# Patient Record
Sex: FEMALE | Race: WHITE
[De-identification: names, ages, dates, MRNs, and addresses within clinical notes are randomized per-mention and may not be internally consistent; named-entity substitution may affect disease eponyms.]

---

## 2018-10-02 ENCOUNTER — HOSPITAL ENCOUNTER (OUTPATIENT)
Dept: HOSPITAL 75 - PREOP | Age: 28
Discharge: HOME | End: 2018-10-02
Attending: OBSTETRICS & GYNECOLOGY
Payer: COMMERCIAL

## 2018-10-02 VITALS — WEIGHT: 199 LBS | HEIGHT: 67 IN | BODY MASS INDEX: 31.23 KG/M2

## 2018-10-02 DIAGNOSIS — Z01.818: Primary | ICD-10-CM

## 2018-10-05 ENCOUNTER — HOSPITAL ENCOUNTER (OUTPATIENT)
Dept: HOSPITAL 75 - SDC | Age: 28
Discharge: HOME | End: 2018-10-05
Attending: OBSTETRICS & GYNECOLOGY
Payer: COMMERCIAL

## 2018-10-05 VITALS — BODY MASS INDEX: 31.23 KG/M2 | HEIGHT: 67 IN | WEIGHT: 199 LBS

## 2018-10-05 VITALS — SYSTOLIC BLOOD PRESSURE: 115 MMHG | DIASTOLIC BLOOD PRESSURE: 70 MMHG

## 2018-10-05 VITALS — DIASTOLIC BLOOD PRESSURE: 70 MMHG | SYSTOLIC BLOOD PRESSURE: 115 MMHG

## 2018-10-05 VITALS — SYSTOLIC BLOOD PRESSURE: 109 MMHG | DIASTOLIC BLOOD PRESSURE: 74 MMHG

## 2018-10-05 VITALS — DIASTOLIC BLOOD PRESSURE: 71 MMHG | SYSTOLIC BLOOD PRESSURE: 123 MMHG

## 2018-10-05 VITALS — DIASTOLIC BLOOD PRESSURE: 81 MMHG | SYSTOLIC BLOOD PRESSURE: 124 MMHG

## 2018-10-05 DIAGNOSIS — N85.9: ICD-10-CM

## 2018-10-05 DIAGNOSIS — E03.9: ICD-10-CM

## 2018-10-05 DIAGNOSIS — Z79.899: ICD-10-CM

## 2018-10-05 DIAGNOSIS — N93.8: Primary | ICD-10-CM

## 2018-10-05 LAB
BASOPHILS # BLD AUTO: 0 10^3/UL (ref 0–0.1)
BASOPHILS NFR BLD AUTO: 0 % (ref 0–10)
EOSINOPHIL # BLD AUTO: 0.2 10^3/UL (ref 0–0.3)
EOSINOPHIL NFR BLD AUTO: 2 % (ref 0–10)
ERYTHROCYTE [DISTWIDTH] IN BLOOD BY AUTOMATED COUNT: 13.2 % (ref 10–14.5)
HCT VFR BLD CALC: 41 % (ref 35–52)
HGB BLD-MCNC: 13.6 G/DL (ref 11.5–16)
LYMPHOCYTES # BLD AUTO: 2.9 X 10^3 (ref 1–4)
LYMPHOCYTES NFR BLD AUTO: 32 % (ref 12–44)
MANUAL DIFFERENTIAL PERFORMED BLD QL: NO
MCH RBC QN AUTO: 29 PG (ref 25–34)
MCHC RBC AUTO-ENTMCNC: 33 G/DL (ref 32–36)
MCV RBC AUTO: 87 FL (ref 80–99)
MONOCYTES # BLD AUTO: 0.5 X 10^3 (ref 0–1)
MONOCYTES NFR BLD AUTO: 6 % (ref 0–12)
NEUTROPHILS # BLD AUTO: 5.6 X 10^3 (ref 1.8–7.8)
NEUTROPHILS NFR BLD AUTO: 60 % (ref 42–75)
PLATELET # BLD: 249 10^3/UL (ref 130–400)
PMV BLD AUTO: 12.4 FL (ref 7.4–10.4)
RBC # BLD AUTO: 4.76 10^6/UL (ref 4.35–5.85)
WBC # BLD AUTO: 9.2 10^3/UL (ref 4.3–11)

## 2018-10-05 PROCEDURE — 85025 COMPLETE CBC W/AUTO DIFF WBC: CPT

## 2018-10-05 PROCEDURE — 88305 TISSUE EXAM BY PATHOLOGIST: CPT

## 2018-10-05 PROCEDURE — 36415 COLL VENOUS BLD VENIPUNCTURE: CPT

## 2018-10-05 PROCEDURE — 84703 CHORIONIC GONADOTROPIN ASSAY: CPT

## 2018-10-05 PROCEDURE — 87081 CULTURE SCREEN ONLY: CPT

## 2018-10-05 RX ADMIN — SODIUM CHLORIDE, SODIUM LACTATE, POTASSIUM CHLORIDE, AND CALCIUM CHLORIDE PRN MLS/HR: 600; 310; 30; 20 INJECTION, SOLUTION INTRAVENOUS at 14:56

## 2018-10-05 RX ADMIN — SODIUM CHLORIDE, SODIUM LACTATE, POTASSIUM CHLORIDE, AND CALCIUM CHLORIDE PRN MLS/HR: 600; 310; 30; 20 INJECTION, SOLUTION INTRAVENOUS at 13:39

## 2018-10-05 NOTE — OPERATIVE REPORT
DATE OF SERVICE:  10/05/2018



PREOPERATIVE DIAGNOSES:

Dysfunctional uterine bleeding and intrauterine mass.



POSTOPERATIVE DIAGNOSES:

Dysfunctional uterine bleeding and intrauterine mass with likely endometrial

polyps.



OPERATIVE PROCEDURE:

Hysteroscopy with directed biopsy and D and C.



OPERATIVE DESCRIPTION:

With the patient in supine position under satisfactory general anesthesia, she

was prepped and draped in the usual fashion for vaginal surgery after being

repositioned in the dorsal lithotomy position in the candy cane stirrups.



The urinary bladder was drained with a straight catheter.  Weighted speculum

placed in the posterior fornix of vagina, cervix exposed and grasped anteriorly

with single tooth tenaculum.  Uterus sounded to 9 cm with uterine sound.  Cervix

was then serially dilated with Adarsh dilators to accommodate a hysteroscope. 

Using LR as a distending medium, the hysteroscope was used to examine the

endometrial cavity.  There were polypoid masses emanating from the lower

anterior uterine wall.  Representative biopsies were taken from the right and

from the left and then the endometrial cavity was sharply curettaged in all 4

quadrants with removal of an additional aliquot of tissue.  All the tissue was

sent to pathology labeled appropriately.  The endometrial cavity was reexamined

now with hysteroscope, finding no bleeding and no remaining abnormal pathology,

the procedure was terminated.



The hysteroscope was removed as was the tenaculum.  There was no bleeding from

the puncture sites from the tenaculum.  There was no bleeding from the cervical

os.



Sponge and needle counts were correct.  Estimated blood loss was minimal.  A

total of 500 mL of LR was used and almost that same amount was recovered.  The

patient tolerated the procedure well and was uneventfully awakened from her

general anesthesia and transferred to recovery in stable condition with plans

for discharge home PAR.





Job ID: [id number]

DocumentID: [id number]

Dictated Date:  10/05/2018 14:48:12

Transcription Date: 10/05/2018 16:03:57

Dictated By: DORI SOUSA MD

## 2018-10-05 NOTE — XMS REPORT
Continuity of Care Document

 Created on: 10/05/2018



MELINDA GORMAN

External Reference #: [id number]

: 1990

Sex: Female



Demographics







 Address  [address] [place]  [address]

 

 Home Phone  [phone] x

 

 Preferred Language  Unknown

 

 Marital Status  Unknown

 

 Mandaen Affiliation  Unknown

 

 Race  Unknown

 

 Ethnic Group  Unknown





Author







 Author  Counts include 234 beds at the [facility]

 

 Address  Unknown

 

 Phone  Unavailable



              



Allergies

      





 Active            Description            Code            Type            
Severity            Reaction            Onset            Reported/Identified   
         Relationship to Patient            Clinical Status        

 

 Yes            No Known Drug Allergies            [id number]            Drug 
Allergy            Unknown            N/A                         2011   
                               



                  



Medications

      



There is no data.                  



Problems

      





 Date Dx Coded            Attending            Type            Code            
Diagnosis            Diagnosed By        

 

 2013                                      V72.41            PREGNANCY 
TEST NEGATIVE RESULT                     

 

 2013            SHIRA COREAS DO                         V72.41         
   PREGNANCY TEST NEGATIVE RESULT                     

 

 2015            SHIRA COREAS DO                         V05.3          
  HEP B (ADULT) DX                     

 

 2015            DORI SOUSA MD            Ot            N93.8
                                  

 

 2015            DORI SOUSA MD            Ot            
Z01.818                                  

 

 2015            DORI SOUSA MD            Ot            N85.8
            OTHER SPECIFIED NONINFLAMMATORY DISORDER                     

 

 2015            DORI SOUSA MD, Ot            N93.8
            OTHER SPECIFIED ABNORMAL UTERINE AND VAG                     



                              



Procedures

      





 Code            Description            Performed By            Performed On   
     

 

             56311                                  URINE PREGNANCY TEST (IN-
HOUSE)                                   2013        



                  



Results

      



There is no data.              



Encounters

      





 ACCT No.            Visit Date/Time            Discharge            Status    
        Pt. Type            Provider            Facility            Loc./Unit  
          Complaint        

 

 [id number]            2015 16:21:00            2015 23:59:59          
  [facility]            Outpatient            SHIRA COREAS DO                        
                       

 

 [id number]            2013 16:09:00            2013 23:59:59          
  [facility]            Outpatient                                                    
        

 

 [id number]            2015 11:04:00            2015 15:45:00    
        DIS            Outpatient            DORI SOUSA MD         
   [facility]                     

 

 [id number]            2015 05:47:00            2015 23:59:59    
        CLS            Outpatient            DORI SOUSA MD         
   [facility]            PREOP                     

 

 [id number]            2014 10:55:00            2014 23:59:59    
        [facility]            Outpatient                                              
              

 

 [id number]            2013 10:07:00            2013 23:59:59    
        [facility]            Outpatient                                              
              

 

 2018 06:04:40            2018 23:59:59         
   CLS            Outpatient            Ama Blair

## 2018-10-05 NOTE — DISCHARGE INSTRUCTIONS
Discharge Instructions


Discharge Medications


New, Converted or Re-Newed RX:  RX on Chart





Patient Instructions


Patient Instructions:  


As directed


Return to The Hospital For:  


as directed





Activity & Diet


Discharge Diet:  No Restrictions


Activity as Tolerated:  No





Orders-Post D/C & Referrals





Follow Up Appt:


Call to make follow up appt. for patient in 2 weeks.





Activity: 


Rest for 24 hours, than as tolerated. 





Diet: 


As tolerated-Clear Liquids only if nauseated.





May shower or tub bathe as desired.





No driving for 24 hours, no alcoholic beverages for 24 hours, and nothing per 

vagina (no tampons, douching, or intercourse) for 2 weeks.





Patient to return to the clinic as soon as possible for: Temperature greater 

than 101F, Severe Pain, Foul discharge from incision or vagina, Excessive 

Bleeding (more than a period).











DORI SOUSA MD Oct 5, 2018 1:56 pm

## 2018-10-05 NOTE — PROGRESS NOTE-PRE OPERATIVE
Pre-Operative Progress Note


H&P Reviewed


The H&P was reviewed, patient examined and no changes noted.


Date Seen by Provider:  Oct 5, 2018


Time Seen by Provider:  13:49


Date H&P Reviewed:  Oct 5, 2018


Time H&P Reviewed:  13:49


Pre-Operative Diagnosis:  dub/iNTRAUTERINE MASS











DORI SOUSA MD Oct 5, 2018 1:50 pm

## 2018-10-05 NOTE — PROGRESS NOTE-POST OPERATIVE
Post-Operative Progess Note


Surgeon (s)/Assistant (s)


Surgeon


DORI SOUSA MD


Assistant:  NONE





Pre-Operative Diagnosis


dub/iNTRAUTERINE MASS





Post-Operative Diagnosis





Same with pathology pending





Procedure & Operative Findings


Date of Procedure


10/5/18


Procedure Performed/Findings


Hysteroscopy with directed biopsy and D&C


Anesthesia Type


GETA





Estimated Blood Loss


Estimated blood loss (mL):  MIN





Specimens/Packing


Specimens Removed


Directed biopsy and endometrial curettings


Packing:  


None required











DORI SOUSA MD Oct 5, 2018 13:50

## 2019-09-04 ENCOUNTER — HOSPITAL ENCOUNTER (INPATIENT)
Dept: HOSPITAL 75 - LDRP | Age: 29
LOS: 2 days | Discharge: HOME | End: 2019-09-06
Attending: OBSTETRICS & GYNECOLOGY | Admitting: OBSTETRICS & GYNECOLOGY
Payer: COMMERCIAL

## 2019-09-04 VITALS — DIASTOLIC BLOOD PRESSURE: 73 MMHG | SYSTOLIC BLOOD PRESSURE: 130 MMHG

## 2019-09-04 VITALS — DIASTOLIC BLOOD PRESSURE: 74 MMHG | SYSTOLIC BLOOD PRESSURE: 123 MMHG

## 2019-09-04 VITALS — DIASTOLIC BLOOD PRESSURE: 76 MMHG | SYSTOLIC BLOOD PRESSURE: 123 MMHG

## 2019-09-04 VITALS — SYSTOLIC BLOOD PRESSURE: 116 MMHG | DIASTOLIC BLOOD PRESSURE: 70 MMHG

## 2019-09-04 VITALS — DIASTOLIC BLOOD PRESSURE: 46 MMHG | SYSTOLIC BLOOD PRESSURE: 81 MMHG

## 2019-09-04 VITALS — DIASTOLIC BLOOD PRESSURE: 64 MMHG | SYSTOLIC BLOOD PRESSURE: 111 MMHG

## 2019-09-04 VITALS — HEIGHT: 66 IN | WEIGHT: 213.01 LBS | BODY MASS INDEX: 34.23 KG/M2

## 2019-09-04 VITALS — SYSTOLIC BLOOD PRESSURE: 104 MMHG | DIASTOLIC BLOOD PRESSURE: 58 MMHG

## 2019-09-04 VITALS — SYSTOLIC BLOOD PRESSURE: 117 MMHG | DIASTOLIC BLOOD PRESSURE: 64 MMHG

## 2019-09-04 VITALS — DIASTOLIC BLOOD PRESSURE: 62 MMHG | SYSTOLIC BLOOD PRESSURE: 135 MMHG

## 2019-09-04 VITALS — SYSTOLIC BLOOD PRESSURE: 110 MMHG | DIASTOLIC BLOOD PRESSURE: 60 MMHG

## 2019-09-04 VITALS — SYSTOLIC BLOOD PRESSURE: 125 MMHG | DIASTOLIC BLOOD PRESSURE: 69 MMHG

## 2019-09-04 VITALS — SYSTOLIC BLOOD PRESSURE: 124 MMHG | DIASTOLIC BLOOD PRESSURE: 72 MMHG

## 2019-09-04 VITALS — SYSTOLIC BLOOD PRESSURE: 123 MMHG | DIASTOLIC BLOOD PRESSURE: 72 MMHG

## 2019-09-04 VITALS — SYSTOLIC BLOOD PRESSURE: 124 MMHG | DIASTOLIC BLOOD PRESSURE: 70 MMHG

## 2019-09-04 VITALS — DIASTOLIC BLOOD PRESSURE: 63 MMHG | SYSTOLIC BLOOD PRESSURE: 111 MMHG

## 2019-09-04 VITALS — SYSTOLIC BLOOD PRESSURE: 115 MMHG | DIASTOLIC BLOOD PRESSURE: 68 MMHG

## 2019-09-04 VITALS — DIASTOLIC BLOOD PRESSURE: 55 MMHG | SYSTOLIC BLOOD PRESSURE: 114 MMHG

## 2019-09-04 VITALS — DIASTOLIC BLOOD PRESSURE: 74 MMHG | SYSTOLIC BLOOD PRESSURE: 128 MMHG

## 2019-09-04 VITALS — DIASTOLIC BLOOD PRESSURE: 66 MMHG | SYSTOLIC BLOOD PRESSURE: 121 MMHG

## 2019-09-04 VITALS — DIASTOLIC BLOOD PRESSURE: 62 MMHG | SYSTOLIC BLOOD PRESSURE: 121 MMHG

## 2019-09-04 VITALS — SYSTOLIC BLOOD PRESSURE: 127 MMHG | DIASTOLIC BLOOD PRESSURE: 71 MMHG

## 2019-09-04 VITALS — DIASTOLIC BLOOD PRESSURE: 74 MMHG | SYSTOLIC BLOOD PRESSURE: 116 MMHG

## 2019-09-04 VITALS — DIASTOLIC BLOOD PRESSURE: 73 MMHG | SYSTOLIC BLOOD PRESSURE: 127 MMHG

## 2019-09-04 VITALS — DIASTOLIC BLOOD PRESSURE: 76 MMHG | SYSTOLIC BLOOD PRESSURE: 120 MMHG

## 2019-09-04 VITALS — DIASTOLIC BLOOD PRESSURE: 59 MMHG | SYSTOLIC BLOOD PRESSURE: 113 MMHG

## 2019-09-04 VITALS — SYSTOLIC BLOOD PRESSURE: 126 MMHG | DIASTOLIC BLOOD PRESSURE: 58 MMHG

## 2019-09-04 VITALS — SYSTOLIC BLOOD PRESSURE: 130 MMHG | DIASTOLIC BLOOD PRESSURE: 72 MMHG

## 2019-09-04 VITALS — DIASTOLIC BLOOD PRESSURE: 62 MMHG | SYSTOLIC BLOOD PRESSURE: 130 MMHG

## 2019-09-04 VITALS — DIASTOLIC BLOOD PRESSURE: 55 MMHG | SYSTOLIC BLOOD PRESSURE: 98 MMHG

## 2019-09-04 VITALS — DIASTOLIC BLOOD PRESSURE: 55 MMHG | SYSTOLIC BLOOD PRESSURE: 105 MMHG

## 2019-09-04 VITALS — SYSTOLIC BLOOD PRESSURE: 111 MMHG | DIASTOLIC BLOOD PRESSURE: 54 MMHG

## 2019-09-04 VITALS — SYSTOLIC BLOOD PRESSURE: 104 MMHG | DIASTOLIC BLOOD PRESSURE: 59 MMHG

## 2019-09-04 VITALS — SYSTOLIC BLOOD PRESSURE: 121 MMHG | DIASTOLIC BLOOD PRESSURE: 67 MMHG

## 2019-09-04 VITALS — SYSTOLIC BLOOD PRESSURE: 108 MMHG | DIASTOLIC BLOOD PRESSURE: 71 MMHG

## 2019-09-04 VITALS — DIASTOLIC BLOOD PRESSURE: 66 MMHG | SYSTOLIC BLOOD PRESSURE: 112 MMHG

## 2019-09-04 VITALS — SYSTOLIC BLOOD PRESSURE: 113 MMHG | DIASTOLIC BLOOD PRESSURE: 67 MMHG

## 2019-09-04 VITALS — DIASTOLIC BLOOD PRESSURE: 71 MMHG | SYSTOLIC BLOOD PRESSURE: 109 MMHG

## 2019-09-04 VITALS — SYSTOLIC BLOOD PRESSURE: 123 MMHG | DIASTOLIC BLOOD PRESSURE: 60 MMHG

## 2019-09-04 VITALS — DIASTOLIC BLOOD PRESSURE: 58 MMHG | SYSTOLIC BLOOD PRESSURE: 106 MMHG

## 2019-09-04 VITALS — SYSTOLIC BLOOD PRESSURE: 115 MMHG | DIASTOLIC BLOOD PRESSURE: 77 MMHG

## 2019-09-04 VITALS — SYSTOLIC BLOOD PRESSURE: 115 MMHG | DIASTOLIC BLOOD PRESSURE: 63 MMHG

## 2019-09-04 VITALS — DIASTOLIC BLOOD PRESSURE: 75 MMHG | SYSTOLIC BLOOD PRESSURE: 125 MMHG

## 2019-09-04 VITALS — DIASTOLIC BLOOD PRESSURE: 81 MMHG | SYSTOLIC BLOOD PRESSURE: 117 MMHG

## 2019-09-04 VITALS — SYSTOLIC BLOOD PRESSURE: 112 MMHG | DIASTOLIC BLOOD PRESSURE: 67 MMHG

## 2019-09-04 VITALS — SYSTOLIC BLOOD PRESSURE: 108 MMHG | DIASTOLIC BLOOD PRESSURE: 65 MMHG

## 2019-09-04 VITALS — SYSTOLIC BLOOD PRESSURE: 132 MMHG | DIASTOLIC BLOOD PRESSURE: 60 MMHG

## 2019-09-04 VITALS — DIASTOLIC BLOOD PRESSURE: 60 MMHG | SYSTOLIC BLOOD PRESSURE: 119 MMHG

## 2019-09-04 VITALS — DIASTOLIC BLOOD PRESSURE: 72 MMHG | SYSTOLIC BLOOD PRESSURE: 123 MMHG

## 2019-09-04 VITALS — SYSTOLIC BLOOD PRESSURE: 121 MMHG | DIASTOLIC BLOOD PRESSURE: 74 MMHG

## 2019-09-04 VITALS — SYSTOLIC BLOOD PRESSURE: 128 MMHG | DIASTOLIC BLOOD PRESSURE: 58 MMHG

## 2019-09-04 VITALS — SYSTOLIC BLOOD PRESSURE: 108 MMHG | DIASTOLIC BLOOD PRESSURE: 62 MMHG

## 2019-09-04 VITALS — SYSTOLIC BLOOD PRESSURE: 127 MMHG | DIASTOLIC BLOOD PRESSURE: 73 MMHG

## 2019-09-04 VITALS — DIASTOLIC BLOOD PRESSURE: 65 MMHG | SYSTOLIC BLOOD PRESSURE: 120 MMHG

## 2019-09-04 VITALS — DIASTOLIC BLOOD PRESSURE: 59 MMHG | SYSTOLIC BLOOD PRESSURE: 101 MMHG

## 2019-09-04 VITALS — DIASTOLIC BLOOD PRESSURE: 79 MMHG | SYSTOLIC BLOOD PRESSURE: 135 MMHG

## 2019-09-04 VITALS — SYSTOLIC BLOOD PRESSURE: 136 MMHG | DIASTOLIC BLOOD PRESSURE: 72 MMHG

## 2019-09-04 VITALS — SYSTOLIC BLOOD PRESSURE: 105 MMHG | DIASTOLIC BLOOD PRESSURE: 66 MMHG

## 2019-09-04 VITALS — DIASTOLIC BLOOD PRESSURE: 62 MMHG | SYSTOLIC BLOOD PRESSURE: 118 MMHG

## 2019-09-04 VITALS — DIASTOLIC BLOOD PRESSURE: 65 MMHG | SYSTOLIC BLOOD PRESSURE: 111 MMHG

## 2019-09-04 VITALS — SYSTOLIC BLOOD PRESSURE: 168 MMHG | DIASTOLIC BLOOD PRESSURE: 72 MMHG

## 2019-09-04 VITALS — SYSTOLIC BLOOD PRESSURE: 107 MMHG | DIASTOLIC BLOOD PRESSURE: 58 MMHG

## 2019-09-04 VITALS — DIASTOLIC BLOOD PRESSURE: 67 MMHG | SYSTOLIC BLOOD PRESSURE: 109 MMHG

## 2019-09-04 VITALS — SYSTOLIC BLOOD PRESSURE: 109 MMHG | DIASTOLIC BLOOD PRESSURE: 55 MMHG

## 2019-09-04 VITALS — SYSTOLIC BLOOD PRESSURE: 115 MMHG | DIASTOLIC BLOOD PRESSURE: 66 MMHG

## 2019-09-04 VITALS — SYSTOLIC BLOOD PRESSURE: 115 MMHG | DIASTOLIC BLOOD PRESSURE: 64 MMHG

## 2019-09-04 VITALS — DIASTOLIC BLOOD PRESSURE: 59 MMHG | SYSTOLIC BLOOD PRESSURE: 102 MMHG

## 2019-09-04 VITALS — DIASTOLIC BLOOD PRESSURE: 57 MMHG | SYSTOLIC BLOOD PRESSURE: 112 MMHG

## 2019-09-04 VITALS — DIASTOLIC BLOOD PRESSURE: 76 MMHG | SYSTOLIC BLOOD PRESSURE: 122 MMHG

## 2019-09-04 VITALS — SYSTOLIC BLOOD PRESSURE: 113 MMHG | DIASTOLIC BLOOD PRESSURE: 59 MMHG

## 2019-09-04 VITALS — SYSTOLIC BLOOD PRESSURE: 120 MMHG | DIASTOLIC BLOOD PRESSURE: 63 MMHG

## 2019-09-04 VITALS — SYSTOLIC BLOOD PRESSURE: 116 MMHG | DIASTOLIC BLOOD PRESSURE: 59 MMHG

## 2019-09-04 VITALS — SYSTOLIC BLOOD PRESSURE: 122 MMHG | DIASTOLIC BLOOD PRESSURE: 64 MMHG

## 2019-09-04 VITALS — DIASTOLIC BLOOD PRESSURE: 71 MMHG | SYSTOLIC BLOOD PRESSURE: 112 MMHG

## 2019-09-04 VITALS — SYSTOLIC BLOOD PRESSURE: 124 MMHG | DIASTOLIC BLOOD PRESSURE: 71 MMHG

## 2019-09-04 VITALS — DIASTOLIC BLOOD PRESSURE: 62 MMHG | SYSTOLIC BLOOD PRESSURE: 124 MMHG

## 2019-09-04 DIAGNOSIS — Z3A.40: ICD-10-CM

## 2019-09-04 DIAGNOSIS — E03.9: ICD-10-CM

## 2019-09-04 DIAGNOSIS — Z23: ICD-10-CM

## 2019-09-04 LAB
BASOPHILS # BLD AUTO: 0 10^3/UL (ref 0–0.1)
BASOPHILS NFR BLD AUTO: 0 % (ref 0–10)
EOSINOPHIL # BLD AUTO: 0.2 10^3/UL (ref 0–0.3)
EOSINOPHIL NFR BLD AUTO: 1 % (ref 0–10)
ERYTHROCYTE [DISTWIDTH] IN BLOOD BY AUTOMATED COUNT: 15.1 % (ref 10–14.5)
HCT VFR BLD CALC: 34 % (ref 35–52)
HGB BLD-MCNC: 11.1 G/DL (ref 11.5–16)
LYMPHOCYTES # BLD AUTO: 2 X 10^3 (ref 1–4)
LYMPHOCYTES NFR BLD AUTO: 17 % (ref 12–44)
MANUAL DIFFERENTIAL PERFORMED BLD QL: NO
MCH RBC QN AUTO: 27 PG (ref 25–34)
MCHC RBC AUTO-ENTMCNC: 33 G/DL (ref 32–36)
MCV RBC AUTO: 83 FL (ref 80–99)
MONOCYTES # BLD AUTO: 0.6 X 10^3 (ref 0–1)
MONOCYTES NFR BLD AUTO: 5 % (ref 0–12)
NEUTROPHILS # BLD AUTO: 8.9 X 10^3 (ref 1.8–7.8)
NEUTROPHILS NFR BLD AUTO: 76 % (ref 42–75)
PLATELET # BLD: 218 10^3/UL (ref 130–400)
PMV BLD AUTO: 12 FL (ref 7.4–10.4)
WBC # BLD AUTO: 11.7 10^3/UL (ref 4.3–11)

## 2019-09-04 PROCEDURE — 86901 BLOOD TYPING SEROLOGIC RH(D): CPT

## 2019-09-04 PROCEDURE — 86850 RBC ANTIBODY SCREEN: CPT

## 2019-09-04 PROCEDURE — 3E033VJ INTRODUCTION OF OTHER HORMONE INTO PERIPHERAL VEIN, PERCUTANEOUS APPROACH: ICD-10-PCS | Performed by: OBSTETRICS & GYNECOLOGY

## 2019-09-04 PROCEDURE — 36415 COLL VENOUS BLD VENIPUNCTURE: CPT

## 2019-09-04 PROCEDURE — 88307 TISSUE EXAM BY PATHOLOGIST: CPT

## 2019-09-04 PROCEDURE — 90715 TDAP VACCINE 7 YRS/> IM: CPT

## 2019-09-04 PROCEDURE — 0KQM0ZZ REPAIR PERINEUM MUSCLE, OPEN APPROACH: ICD-10-PCS | Performed by: OBSTETRICS & GYNECOLOGY

## 2019-09-04 PROCEDURE — 85025 COMPLETE CBC W/AUTO DIFF WBC: CPT

## 2019-09-04 PROCEDURE — 86900 BLOOD TYPING SEROLOGIC ABO: CPT

## 2019-09-04 RX ADMIN — SODIUM CHLORIDE, SODIUM LACTATE, POTASSIUM CHLORIDE, CALCIUM CHLORIDE, AND DEXTROSE MONOHYDRATE SCH MLS/HR: 600; 310; 30; 20; 5 INJECTION, SOLUTION INTRAVENOUS at 07:40

## 2019-09-04 RX ADMIN — SODIUM CHLORIDE, SODIUM LACTATE, POTASSIUM CHLORIDE, CALCIUM CHLORIDE, AND DEXTROSE MONOHYDRATE SCH MLS/HR: 600; 310; 30; 20; 5 INJECTION, SOLUTION INTRAVENOUS at 19:52

## 2019-09-04 RX ADMIN — SODIUM CHLORIDE, SODIUM LACTATE, POTASSIUM CHLORIDE, CALCIUM CHLORIDE, AND DEXTROSE MONOHYDRATE SCH MLS/HR: 600; 310; 30; 20; 5 INJECTION, SOLUTION INTRAVENOUS at 13:42

## 2019-09-04 NOTE — NUR
MELINDA GONZALEZ presented to unit via AMB from HOME, accompanied by SPOUSE, with c/o 
INDUCTION. MELINDA GONZALEZ weighed, gowned,and to bed. 0714 EF and TOCO applied, VS 
taken.  MELINDA GONZALEZ oriented to bed controls, call light, TV, heat, and A/C controls.

## 2019-09-04 NOTE — NUR
1150  REINALDO ZURITA AND MAXIMILIANO ROSEN CRNA here for epidural placement. Procedure 
explained, consent reviewed and signed by anesthesia.  Questions answered to patient's 
satisfaction. Time out taken to verify correct patient/procedure. 1152 Patient up to side of 
bed, assisted into sitting position. 1156  Betadine prep done x3 and sterile drape applied.  
1158 Local done, see anesthesia record.  1206 Test dose given, see anesthesia record for 
drug and dosage.  Epidural catheter secured in place.  Epidural placement complete.  1213 
Assisted back into bed, monitors adjusted.  1207 Epidural dosed, see anesthesia record. 1212 
Epidural of Sufenta/Bupvicaine @12cc/hr stated per pump.  Patient tolerated procedure well.

## 2019-09-04 NOTE — HISTORY & PHYSICAL
History and Physical


Date Seen by Provider:  Sep 4, 2019


Time Seen by Provider:  08:11


This patient is a 28-year-old  1 white female.  EDC is 2019 

patient her 40 weeks admission.  She is admitted now for elective induction of 

labor or pregnancy has been uncomplicated and her GBS culture done after 35 

weeks gestation was negative.  Patient denies rupture membranes denies bleeding 

denies contraction denies nausea vomiting.





Allergies are none





Medications are levothyroxine 200 g daily and prenatal vitamins





Medical social and surgical histories are per the antepartum record





HEENT exam is normal


Neck supple with no lymphadenopathy no thyromegaly


Abdomen is gravid soft nontender nondistended


Extremities show no clubbing or cyanosis.  There is no Homans sign.





Pelvic exam is pending.  Last pelvic exam performed in clinic yesterday shows 

cervix once immune dilated 80 percent effaced -1 to -2 station vertex 

presentation soft and somewhat posterior.





Assessment and plan   term pregnancy now at 40 weeks gestation admitted for 

induction of labor anticipation is for vaginal delivery.  Patient does 

understand there is some possibility that she could require  delivery 

she accepts the induction risks as well as the delivery risks.  She is ready to 

proceed


40 week gestation admitted for elective induction of labor





Allergies and Home Medications


Allergies


Coded Allergies:  


     No Known Drug Allergies (Unverified , 11)





Home Medications


Levothyroxine Sodium 200 Mcg Tablet, 200 MCG PO DAILY, (Reported)


Oxycodone HCl/Acetaminophen 1 Each Tablet, 1 EACH PO Q4H PRN for PAIN-MODERATE


   Prescribed by: DORI SUAREZ on 10/5/18 1354


Prenatal Vit/Iron Fumarate/FA 1 Each Tablet, 1 EACH PO DAILY, (Reported)





Patient Home Medication List


Home Medication List Reviewed:  Yes











DORI SOUSA MD        Sep 4, 2019 08:14

## 2019-09-05 VITALS — DIASTOLIC BLOOD PRESSURE: 69 MMHG | SYSTOLIC BLOOD PRESSURE: 125 MMHG

## 2019-09-05 VITALS — DIASTOLIC BLOOD PRESSURE: 68 MMHG | SYSTOLIC BLOOD PRESSURE: 118 MMHG

## 2019-09-05 VITALS — DIASTOLIC BLOOD PRESSURE: 68 MMHG | SYSTOLIC BLOOD PRESSURE: 123 MMHG

## 2019-09-05 VITALS — DIASTOLIC BLOOD PRESSURE: 66 MMHG | SYSTOLIC BLOOD PRESSURE: 117 MMHG

## 2019-09-05 VITALS — DIASTOLIC BLOOD PRESSURE: 57 MMHG | SYSTOLIC BLOOD PRESSURE: 119 MMHG

## 2019-09-05 VITALS — SYSTOLIC BLOOD PRESSURE: 119 MMHG | DIASTOLIC BLOOD PRESSURE: 85 MMHG

## 2019-09-05 VITALS — DIASTOLIC BLOOD PRESSURE: 55 MMHG | SYSTOLIC BLOOD PRESSURE: 117 MMHG

## 2019-09-05 VITALS — DIASTOLIC BLOOD PRESSURE: 68 MMHG | SYSTOLIC BLOOD PRESSURE: 128 MMHG

## 2019-09-05 RX ADMIN — OXYCODONE HYDROCHLORIDE AND ACETAMINOPHEN PRN TAB: 5; 325 TABLET ORAL at 08:16

## 2019-09-05 RX ADMIN — IBUPROFEN SCH MG: 800 TABLET, FILM COATED ORAL at 20:26

## 2019-09-05 RX ADMIN — DOCUSATE SODIUM SCH MG: 100 CAPSULE ORAL at 08:15

## 2019-09-05 RX ADMIN — IBUPROFEN SCH MG: 800 TABLET, FILM COATED ORAL at 12:22

## 2019-09-05 RX ADMIN — OXYCODONE HYDROCHLORIDE AND ACETAMINOPHEN PRN TAB: 5; 325 TABLET ORAL at 20:26

## 2019-09-05 RX ADMIN — KETOROLAC TROMETHAMINE PRN MG: 30 INJECTION, SOLUTION INTRAMUSCULAR; INTRAVENOUS at 00:57

## 2019-09-05 RX ADMIN — KETOROLAC TROMETHAMINE PRN MG: 30 INJECTION, SOLUTION INTRAMUSCULAR; INTRAVENOUS at 06:38

## 2019-09-05 RX ADMIN — OXYCODONE HYDROCHLORIDE AND ACETAMINOPHEN PRN TAB: 5; 325 TABLET ORAL at 13:32

## 2019-09-05 RX ADMIN — DOCUSATE SODIUM SCH MG: 100 CAPSULE ORAL at 20:26

## 2019-09-05 NOTE — NUR
Pt to bathroom standby, unable to void, pericare pads changed, pt to wc and transferred to 
pp room 311 as pt remains under the care of this rn. Will cont to monitor.

## 2019-09-05 NOTE — PROGRESS NOTE
Standard Progress Note


Progress Notes/Assess & Plan


Date Seen by a Provider:  Sep 5, 2019


Time Seen by a Provider:  07:52


Progress/Assessment & Plan


This patient is without complaint.  She is ablating, voiding, tolerating oral 

intake well has good pain control.








Vital Signs








 9/4/19 9/5/19





 22:30 06:38


 


Temp  97.7


 


Pulse  84


 


Resp  18


 


B/P (MAP)  117/66 (83)


 


Pulse Ox  96


 


O2 Delivery  Room Air


 


O2 Flow Rate 15.00 





Vital signs are stable.  Patient afebrile.





Fundus is firm below the umbilicus and nontender.


Extremities show no clubbing cyanosis.  There is no Homans sign.





Assessment and plan   postpartum day number 1 status post term spontaneous 

vaginal delivery at 40 weeks gestation.  Patient is doing well and will have 

routine convalescence care today and probably discharge home tomorrow











DORI SOUSA MD        Sep 5, 2019 07:52

## 2019-09-05 NOTE — DISCHARGE INSTRUCTIONS
Discharge Instructions


Reconcile Patient Problems


Problems Reviewed?:  Yes





Discharge Medications


New, Converted or Re-Newed RX:  RX on Chart





Patient Instructions


Patient Instructions:  


As directed


Return to The Hospital For:  


As directed





Activity & Diet


Discharge Diet:  No Restrictions


Activity as Tolerated:  No





Orders-Post D/C & Referrals





Follow Up Appt:


Call to make follow up appt. for patient in 4 weeks.





Activity 


Per routine post vaginal delivery instructions.





Please call in RX to patient pharmacy.





Diet as tolerated





Patient may shower or tub bathe as desired.











DORI SOUSA MD        Sep 5, 2019 11:49

## 2019-09-05 NOTE — NUR
Report given to Augustina Cisneros RN. Patient lying in room watching T.V. Mood pleasant, calm. 
No c/o pain or discomfort at this time.

## 2019-09-05 NOTE — OPERATIVE REPORT
DATE OF SERVICE:  09/04/2019



The patient delivered a viable female infant with Apgars of 8 and 9 at 1 and 5

minutes respectively, weight of 7 pounds 7 ounces, birth time of 22:37 and a

cord blood pH that is pending.  The infant was delivered over a midline

episiotomy that was performed as the posterior fourchette and hymenal were

beginning to tear and shred.  The infant was bulb suctioned on delivery of the

head and again on completion of delivery.  The umbilical cord eventually was

doubly clamped, father cut the cord, the baby was passed to mom's abdomen.  The

placenta delivered fairly promptly spontaneously Hernandez.  It was normal with a

3-vessel cord.  It was sent to pathology for permanent section.



The cervix, vagina, rectum, and perineum were examined and found intact, except

for a midline episiotomy with a posterior vaginal wall extension.  The entire

complex was second-degree.  Repair was affected with a single suture of 3-0

Vicryl in the usual manner starting at the apex of the posterior vaginal wall

extension of the episiotomy and then repairing the episiotomy in the usual

manner.  Good reapproximation was achieved and hemostasis was complete on

completion of the repair.  Sponge and needle counts were correct on completion

of delivery and the repair.  The estimated blood loss was around 200 mL.  The

patient tolerated the delivery and the repair well and remained in the LDR for

recovery.  The baby remained with the mom.





Job ID: [id number]

DocumentID: [id number]

Dictated Date:  09/04/2019 23:00:12

Transcription Date: 09/05/2019 04:38:18

Dictated By: DORI SOUSA MD

## 2019-09-05 NOTE — ANESTHESIA-REGIONAL POST-OP
Regional


Patient Condition


Mental Status:  Alert, Oriented x3


Circulation:  Same as Pre-Op


Headache:  Absent


Sensation:  Full Recovery


Motor Block:  Absent





Post Op Complications


Complications


None





Follow Up Care/Instructions


Patient Instructions


None needed.





Anesthesia/Patient Condition


Patient is doing well, no complaints, stable vital signs, no apparent adverse 

anesthesia problems.   


No complications reported per nursing.











СВЕТЛАНА LOVELL CRNA           Sep 5, 2019 08:47

## 2019-09-06 VITALS — SYSTOLIC BLOOD PRESSURE: 111 MMHG | DIASTOLIC BLOOD PRESSURE: 71 MMHG

## 2019-09-06 VITALS — DIASTOLIC BLOOD PRESSURE: 72 MMHG | SYSTOLIC BLOOD PRESSURE: 120 MMHG

## 2019-09-06 VITALS — DIASTOLIC BLOOD PRESSURE: 68 MMHG | SYSTOLIC BLOOD PRESSURE: 120 MMHG

## 2019-09-06 RX ADMIN — IBUPROFEN SCH MG: 800 TABLET, FILM COATED ORAL at 02:12

## 2019-09-06 RX ADMIN — IBUPROFEN SCH MG: 800 TABLET, FILM COATED ORAL at 02:04

## 2019-09-06 RX ADMIN — IBUPROFEN SCH MG: 800 TABLET, FILM COATED ORAL at 08:46

## 2019-09-06 RX ADMIN — OXYCODONE HYDROCHLORIDE AND ACETAMINOPHEN PRN TAB: 5; 325 TABLET ORAL at 02:45

## 2019-09-06 RX ADMIN — IBUPROFEN SCH MG: 800 TABLET, FILM COATED ORAL at 02:13

## 2019-09-06 RX ADMIN — DOCUSATE SODIUM SCH MG: 100 CAPSULE ORAL at 08:46

## 2019-09-06 NOTE — PROGRESS NOTE
Standard Progress Note


Progress Notes/Assess & Plan


Date Seen by a Provider:  Sep 6, 2019


Time Seen by a Provider:  07:48


Progress/Assessment & Plan


This patient is without complaint.  She is ablating, voiding, tolerating oral 

intake well has good pain control.








Vital Signs








 9/4/19 9/5/19





 22:30 06:38


 


Temp  97.7


 


Pulse  84


 


Resp  18


 


B/P (MAP)  117/66 (83)


 


Pulse Ox  96


 


O2 Delivery  Room Air


 


O2 Flow Rate 15.00 





Vital signs are stable.  Patient afebrile.





Fundus is firm below the umbilicus and nontender.


Extremities show no clubbing cyanosis.  There is no Homans sign.





Assessment and plan   postpartum day number 1 status post term spontaneous 

vaginal delivery at 40 weeks gestation.  Patient is doing well and will have 

routine convalescence care today and probably discharge home tomorrow





September 6, 2019


Patient is without complaint.  She is ambulating, voiding, tolerating oral 

intake well has good pain control.  Patient is requesting discharge home.








Vital Signs








  Date Time  Temp Pulse Resp B/P (MAP) Pulse Ox O2 Delivery O2 Flow Rate FiO2


 


9/6/19 02:04 98.1 92 18 111/71 (84) 99 Room Air  


 


9/5/19 20:27 98.5 95 18 119/85 (96) 98 Room Air  


 


9/5/19 16:30 97.2 95 18 125/69 (87) 96 Room Air  


 


9/5/19 12:00 98.1 92 18 118/68 (85) 99 Room Air  


 


9/5/19 08:00     97 Room Air  


 


9/5/19 08:00 97.6 85 16 119/57 (77) 97 Room Air  





Vital signs are stable.  Patient is afebrile.





The abdomen is benign.  Fundus is firm below the umbilicus and nontender.


Extremities show no cyanosis there is no Homans sign.





Assessment and plan   postpartum day number 2 status post term spontaneous 

vaginal delivery at 40 weeks gestation doing well.  Plan is for discharge home 

with follow-up in clinic


Final Diagnosis


40 week spontaneous vaginal delivery











DORI SOUSA MD        Sep 6, 2019 07:49

## 2019-09-06 NOTE — NUR
Discharged to home.  Ambulates self downstairs accompanied by staff to private vehicle with 
belongings in hand.

## 2019-09-06 NOTE — NUR
Discharge instructions explained, signed and copy to patient.  pt verbalized understanding 
of instructions and denied questions.  prescriptions given and called to pharmacy.

## 2021-06-02 ENCOUNTER — HOSPITAL ENCOUNTER (INPATIENT)
Dept: HOSPITAL 75 - LDRP | Age: 31
LOS: 2 days | Discharge: HOME | End: 2021-06-04
Attending: OBSTETRICS & GYNECOLOGY | Admitting: OBSTETRICS & GYNECOLOGY
Payer: COMMERCIAL

## 2021-06-02 VITALS — BODY MASS INDEX: 35.96 KG/M2 | HEIGHT: 65.75 IN | WEIGHT: 221.12 LBS

## 2021-06-02 VITALS — SYSTOLIC BLOOD PRESSURE: 117 MMHG | DIASTOLIC BLOOD PRESSURE: 67 MMHG

## 2021-06-02 VITALS — DIASTOLIC BLOOD PRESSURE: 64 MMHG | SYSTOLIC BLOOD PRESSURE: 121 MMHG

## 2021-06-02 VITALS — SYSTOLIC BLOOD PRESSURE: 113 MMHG | DIASTOLIC BLOOD PRESSURE: 61 MMHG

## 2021-06-02 VITALS — DIASTOLIC BLOOD PRESSURE: 65 MMHG | SYSTOLIC BLOOD PRESSURE: 120 MMHG

## 2021-06-02 VITALS — DIASTOLIC BLOOD PRESSURE: 57 MMHG | SYSTOLIC BLOOD PRESSURE: 113 MMHG

## 2021-06-02 VITALS — SYSTOLIC BLOOD PRESSURE: 120 MMHG | DIASTOLIC BLOOD PRESSURE: 65 MMHG

## 2021-06-02 VITALS — SYSTOLIC BLOOD PRESSURE: 115 MMHG | DIASTOLIC BLOOD PRESSURE: 67 MMHG

## 2021-06-02 VITALS — SYSTOLIC BLOOD PRESSURE: 127 MMHG | DIASTOLIC BLOOD PRESSURE: 70 MMHG

## 2021-06-02 VITALS — SYSTOLIC BLOOD PRESSURE: 117 MMHG | DIASTOLIC BLOOD PRESSURE: 69 MMHG

## 2021-06-02 VITALS — DIASTOLIC BLOOD PRESSURE: 70 MMHG | SYSTOLIC BLOOD PRESSURE: 119 MMHG

## 2021-06-02 VITALS — DIASTOLIC BLOOD PRESSURE: 70 MMHG | SYSTOLIC BLOOD PRESSURE: 126 MMHG

## 2021-06-02 VITALS — SYSTOLIC BLOOD PRESSURE: 137 MMHG | DIASTOLIC BLOOD PRESSURE: 63 MMHG

## 2021-06-02 VITALS — SYSTOLIC BLOOD PRESSURE: 124 MMHG | DIASTOLIC BLOOD PRESSURE: 70 MMHG

## 2021-06-02 VITALS — SYSTOLIC BLOOD PRESSURE: 125 MMHG | DIASTOLIC BLOOD PRESSURE: 83 MMHG

## 2021-06-02 VITALS — DIASTOLIC BLOOD PRESSURE: 65 MMHG | SYSTOLIC BLOOD PRESSURE: 119 MMHG

## 2021-06-02 VITALS — DIASTOLIC BLOOD PRESSURE: 76 MMHG | SYSTOLIC BLOOD PRESSURE: 141 MMHG

## 2021-06-02 VITALS — SYSTOLIC BLOOD PRESSURE: 141 MMHG | DIASTOLIC BLOOD PRESSURE: 65 MMHG

## 2021-06-02 VITALS — DIASTOLIC BLOOD PRESSURE: 55 MMHG | SYSTOLIC BLOOD PRESSURE: 105 MMHG

## 2021-06-02 VITALS — DIASTOLIC BLOOD PRESSURE: 62 MMHG | SYSTOLIC BLOOD PRESSURE: 117 MMHG

## 2021-06-02 VITALS — SYSTOLIC BLOOD PRESSURE: 120 MMHG | DIASTOLIC BLOOD PRESSURE: 73 MMHG

## 2021-06-02 VITALS — SYSTOLIC BLOOD PRESSURE: 124 MMHG | DIASTOLIC BLOOD PRESSURE: 58 MMHG

## 2021-06-02 VITALS — SYSTOLIC BLOOD PRESSURE: 114 MMHG | DIASTOLIC BLOOD PRESSURE: 62 MMHG

## 2021-06-02 VITALS — SYSTOLIC BLOOD PRESSURE: 113 MMHG | DIASTOLIC BLOOD PRESSURE: 63 MMHG

## 2021-06-02 VITALS — SYSTOLIC BLOOD PRESSURE: 113 MMHG | DIASTOLIC BLOOD PRESSURE: 64 MMHG

## 2021-06-02 VITALS — SYSTOLIC BLOOD PRESSURE: 119 MMHG | DIASTOLIC BLOOD PRESSURE: 59 MMHG

## 2021-06-02 VITALS — SYSTOLIC BLOOD PRESSURE: 123 MMHG | DIASTOLIC BLOOD PRESSURE: 67 MMHG

## 2021-06-02 VITALS — SYSTOLIC BLOOD PRESSURE: 122 MMHG | DIASTOLIC BLOOD PRESSURE: 74 MMHG

## 2021-06-02 VITALS — SYSTOLIC BLOOD PRESSURE: 110 MMHG | DIASTOLIC BLOOD PRESSURE: 70 MMHG

## 2021-06-02 VITALS — DIASTOLIC BLOOD PRESSURE: 61 MMHG | SYSTOLIC BLOOD PRESSURE: 119 MMHG

## 2021-06-02 VITALS — DIASTOLIC BLOOD PRESSURE: 60 MMHG | SYSTOLIC BLOOD PRESSURE: 127 MMHG

## 2021-06-02 VITALS — SYSTOLIC BLOOD PRESSURE: 118 MMHG | DIASTOLIC BLOOD PRESSURE: 69 MMHG

## 2021-06-02 VITALS — SYSTOLIC BLOOD PRESSURE: 128 MMHG | DIASTOLIC BLOOD PRESSURE: 60 MMHG

## 2021-06-02 VITALS — SYSTOLIC BLOOD PRESSURE: 126 MMHG | DIASTOLIC BLOOD PRESSURE: 77 MMHG

## 2021-06-02 VITALS — SYSTOLIC BLOOD PRESSURE: 110 MMHG | DIASTOLIC BLOOD PRESSURE: 75 MMHG

## 2021-06-02 VITALS — DIASTOLIC BLOOD PRESSURE: 70 MMHG | SYSTOLIC BLOOD PRESSURE: 117 MMHG

## 2021-06-02 VITALS — SYSTOLIC BLOOD PRESSURE: 115 MMHG | DIASTOLIC BLOOD PRESSURE: 63 MMHG

## 2021-06-02 VITALS — DIASTOLIC BLOOD PRESSURE: 71 MMHG | SYSTOLIC BLOOD PRESSURE: 121 MMHG

## 2021-06-02 VITALS — SYSTOLIC BLOOD PRESSURE: 123 MMHG | DIASTOLIC BLOOD PRESSURE: 68 MMHG

## 2021-06-02 DIAGNOSIS — O41.03X0: Primary | ICD-10-CM

## 2021-06-02 DIAGNOSIS — Z3A.38: ICD-10-CM

## 2021-06-02 DIAGNOSIS — L91.8: ICD-10-CM

## 2021-06-02 DIAGNOSIS — D69.6: ICD-10-CM

## 2021-06-02 LAB
BASOPHILS # BLD AUTO: 0 10^3/UL (ref 0–0.1)
BASOPHILS NFR BLD AUTO: 0 % (ref 0–10)
EOSINOPHIL # BLD AUTO: 0.2 10^3/UL (ref 0–0.3)
EOSINOPHIL NFR BLD AUTO: 2 % (ref 0–10)
HCT VFR BLD CALC: 36 % (ref 35–52)
HGB BLD-MCNC: 11.6 G/DL (ref 11.5–16)
LYMPHOCYTES # BLD AUTO: 2.1 10^3/UL (ref 1–4)
LYMPHOCYTES NFR BLD AUTO: 19 % (ref 12–44)
MANUAL DIFFERENTIAL PERFORMED BLD QL: NO
MCH RBC QN AUTO: 27 PG (ref 25–34)
MCHC RBC AUTO-ENTMCNC: 32 G/DL (ref 32–36)
MCV RBC AUTO: 85 FL (ref 80–99)
MONOCYTES # BLD AUTO: 0.6 10^3/UL (ref 0–1)
MONOCYTES NFR BLD AUTO: 6 % (ref 0–12)
NEUTROPHILS # BLD AUTO: 7.9 10^3/UL (ref 1.8–7.8)
NEUTROPHILS NFR BLD AUTO: 72 % (ref 42–75)
PLATELET # BLD: 226 10^3/UL (ref 130–400)
PMV BLD AUTO: 12.1 FL (ref 9–12.2)
WBC # BLD AUTO: 11 10^3/UL (ref 4.3–11)

## 2021-06-02 PROCEDURE — 3E033VJ INTRODUCTION OF OTHER HORMONE INTO PERIPHERAL VEIN, PERCUTANEOUS APPROACH: ICD-10-PCS | Performed by: OBSTETRICS & GYNECOLOGY

## 2021-06-02 PROCEDURE — 0HQ9XZZ REPAIR PERINEUM SKIN, EXTERNAL APPROACH: ICD-10-PCS | Performed by: OBSTETRICS & GYNECOLOGY

## 2021-06-02 PROCEDURE — 86780 TREPONEMA PALLIDUM: CPT

## 2021-06-02 PROCEDURE — 86850 RBC ANTIBODY SCREEN: CPT

## 2021-06-02 PROCEDURE — 36415 COLL VENOUS BLD VENIPUNCTURE: CPT

## 2021-06-02 PROCEDURE — 0HBAXZZ EXCISION OF INGUINAL SKIN, EXTERNAL APPROACH: ICD-10-PCS | Performed by: OBSTETRICS & GYNECOLOGY

## 2021-06-02 PROCEDURE — 86901 BLOOD TYPING SEROLOGIC RH(D): CPT

## 2021-06-02 PROCEDURE — 85025 COMPLETE CBC W/AUTO DIFF WBC: CPT

## 2021-06-02 PROCEDURE — 86900 BLOOD TYPING SEROLOGIC ABO: CPT

## 2021-06-02 RX ADMIN — SODIUM CHLORIDE, SODIUM LACTATE, POTASSIUM CHLORIDE, CALCIUM CHLORIDE, AND DEXTROSE MONOHYDRATE SCH MLS/HR: 600; 310; 30; 20; 5 INJECTION, SOLUTION INTRAVENOUS at 13:32

## 2021-06-02 RX ADMIN — KETOROLAC TROMETHAMINE PRN MG: 30 INJECTION, SOLUTION INTRAMUSCULAR; INTRAVENOUS at 22:53

## 2021-06-02 RX ADMIN — SODIUM CHLORIDE, SODIUM LACTATE, POTASSIUM CHLORIDE, CALCIUM CHLORIDE, AND DEXTROSE MONOHYDRATE SCH MLS/HR: 600; 310; 30; 20; 5 INJECTION, SOLUTION INTRAVENOUS at 19:40

## 2021-06-02 NOTE — HISTORY & PHYSICAL
History and Physical


Date Seen by Provider:  2021


Time Seen by Provider:  13:54


This patient is a 30-year-old  2 para 1 white female who was sent from my

office to labor and delivery due to CAMILO of 4.7.  Her pregnancy is also 

complicated by thrombocytopenia with platelet count of 123,000 at 1 point.  

Patient denies rupture membranes or bleeding.  Her GBS culture was positive





Allergies are none





Medications are prenatal vitamins





Medical social and surgical history is all per the antepartum record





HEENT exam is normal


Neck is supple no lymphadenopathy no thyromegaly


Abdomen is gravid soft nontender nondistended


Extremities show no clubbing or cyanosis.  There is no Homans' sign.





Pelvic exam in my clinic shows a cervix 2 cm dilated 70% effaced -1 station -

recheck pelvic exam is pending





Assessment and plan      38+ weeks gestation in a patient with a significant 

oligohydramnios with new onset and with history of thrombocytopenia.  She also 

has a GBS positive culture so we waited start her on antibiotics for GBS 

prophylaxis


38-week pregnancy with oligohydramnios and history of thrombocytopenia and a GBS

positive culture





Allergies and Home Medications


Allergies


Coded Allergies:  


     No Known Drug Allergies (Unverified , 11)





Home Medications


Levothyroxine Sodium 200 Mcg Tablet, 200 MCG PO DAILY, (Reported)


   Last Action: Reviewed


Prenatal Vit/Iron Fumarate/FA 1 Each Tablet, 1 EACH PO DAILY, (Reported)


   Last Action: Reviewed





Patient Home Medication List


Home Medication List Reviewed:  Yes











DORI SOUSA MD        2021 13:57

## 2021-06-02 NOTE — DISCHARGE INST-SURGICAL
Discharge Inst-Surgical


Depart Medication/Instructions


New, Converted or Re-Newed RX:  RX on Chart





Consults/Follow Up


Patient Instructions:  


As directed


Orders & Referrals





Follow Up Appt:


Call to make follow up appt. for patient in 4 weeks.





Activity 


Per routine post vaginal delivery instructions.





Please call in RX to patient pharmacy.





Diet as tolerated





Patient may shower or tub bathe as desired.





Activity


Activity as Tolerated:  No





Diet


Discharge Diet:  No Restrictions











DORI SOUSA MD        Jun 2, 2021 14:08

## 2021-06-03 VITALS — DIASTOLIC BLOOD PRESSURE: 59 MMHG | SYSTOLIC BLOOD PRESSURE: 111 MMHG

## 2021-06-03 VITALS — DIASTOLIC BLOOD PRESSURE: 66 MMHG | SYSTOLIC BLOOD PRESSURE: 110 MMHG

## 2021-06-03 VITALS — SYSTOLIC BLOOD PRESSURE: 122 MMHG | DIASTOLIC BLOOD PRESSURE: 63 MMHG

## 2021-06-03 VITALS — SYSTOLIC BLOOD PRESSURE: 107 MMHG | DIASTOLIC BLOOD PRESSURE: 58 MMHG

## 2021-06-03 VITALS — DIASTOLIC BLOOD PRESSURE: 57 MMHG | SYSTOLIC BLOOD PRESSURE: 119 MMHG

## 2021-06-03 RX ADMIN — DOCUSATE SODIUM SCH MG: 100 CAPSULE ORAL at 08:41

## 2021-06-03 RX ADMIN — DOCUSATE SODIUM SCH MG: 100 CAPSULE ORAL at 21:45

## 2021-06-03 RX ADMIN — KETOROLAC TROMETHAMINE PRN MG: 30 INJECTION, SOLUTION INTRAMUSCULAR; INTRAVENOUS at 04:47

## 2021-06-03 RX ADMIN — IBUPROFEN SCH MG: 800 TABLET, FILM COATED ORAL at 16:09

## 2021-06-03 RX ADMIN — IBUPROFEN SCH MG: 800 TABLET, FILM COATED ORAL at 08:41

## 2021-06-03 RX ADMIN — OXYCODONE HYDROCHLORIDE AND ACETAMINOPHEN PRN TAB: 5; 325 TABLET ORAL at 08:41

## 2021-06-03 RX ADMIN — IBUPROFEN SCH MG: 800 TABLET, FILM COATED ORAL at 21:46

## 2021-06-03 RX ADMIN — OXYCODONE HYDROCHLORIDE AND ACETAMINOPHEN PRN TAB: 5; 325 TABLET ORAL at 13:12

## 2021-06-03 NOTE — ANESTHESIA-REGIONAL POST-OP
Regional


Patient Condition


Mental Status:  Alert, Oriented x3


Circulation:  Same as Pre-Op


Headache:  Absent


Sensation:  Full Recovery


Motor Block:  Absent





Post Op Complications


Complications


None





Follow Up Care/Instructions


Patient Instructions


None needed.





Anesthesia/Patient Condition


Patient is doing well, no complaints, stable vital signs, no apparent adverse 

anesthesia problems.   


No complications reported per nursing.











CASE COATES CRNA          Mann 3, 2021 09:16

## 2021-06-03 NOTE — PROGRESS NOTE
Standard Progress Note


Progress Notes/Assess & Plan


Date Seen by a Provider:  Mann 3, 2021


Time Seen by a Provider:  09:04


Progress/Assessment & Plan


This patient is without complaint.  She is ambulating, voiding, tolerating oral 

intake well has good pain control.








Vital Signs








  Date Time  Temp Pulse Resp B/P (MAP) Pulse Ox O2 Delivery O2 Flow Rate FiO2


 


6/3/21 04:47 36.2 73 18 107/58 (74)  Room Air  


 


6/2/21 23:30  93 18 141/65 (90)  Room Air  


 


6/2/21 22:55 36.0 90 18 117/69 (85)  Room Air  


 


6/2/21 22:20  84 18 120/73 (89)  Room Air  


 


6/2/21 22:05  83 18 113/64 (80)  Room Air  


 


6/2/21 21:50  95 18 110/70 (83)  Room Air  


 


6/2/21 21:35  80 18 119/65 (83)  Room Air  


 


6/2/21 21:05 36.3 82 18 125/83 (97)  Room Air  


 


6/2/21 20:50  80 18 124/70 (88)  Room Air  


 


6/2/21 20:35  82 18 118/69 (85)  Room Air  


 


6/2/21 20:20  87 18 117/70 (86)  Room Air  


 


6/2/21 20:05  93 18 126/77 (93)  Room Air  


 


6/2/21 19:50  83 18 122/74 (90)  Room Air  


 


6/2/21 19:35 36.8 73 18 120/65 (83) 99 Room Air  


 


6/2/21 19:20  84 18 120/65 (83) 98 Room Air  


 


6/2/21 18:50  86 20 113/63 (80) 98 Room Air  


 


6/2/21 18:35  80 20 119/70 (86) 94 Room Air  


 


6/2/21 18:20  83 20 114/62 (79) 98 Room Air  


 


6/2/21 18:05  87 20 115/63 (80) 98 Room Air  


 


6/2/21 17:50  79 20 121/71 (88) 98 Room Air  


 


6/2/21 17:35  80 20 118/69 (85) 93 Room Air  


 


6/2/21 17:20  76 20 126/70 (88) 98 Room Air  


 


6/2/21 17:05  86 20 123/68 (86) 98 Room Air  


 


6/2/21 16:50 35.9 82 20 128/60 (82) 97 Room Air  


 


6/2/21 16:30  87 20 137/63 (87) 97 Room Air  


 


6/2/21 16:15  83 20 127/60 (82) 99 Room Air  


 


6/2/21 16:00  88 20 117/67 (84) 98 Room Air  


 


6/2/21 15:50  93 20 110/75 (87) 98 Room Air  


 


6/2/21 15:45  93 20 113/61 (78) 99 Room Air  


 


6/2/21 15:40  95 20 105/55 (72) 98 Room Air  


 


6/2/21 15:35  93 20 119/61 (80) 99 Room Air  


 


6/2/21 15:30  95 20 119/59 (79) 98 Room Air  


 


6/2/21 15:25  94 20 124/58 (80) 98 Room Air  


 


6/2/21 15:20  97 20 121/64 (83) 98 Room Air  


 


6/2/21 15:15  89 20 123/67 (85) 99 Room Air  


 


6/2/21 15:10  92 20 127/70 (89) 98 Room Air  


 


6/2/21 15:05  100 20 141/76 (97) 99 Room Air  


 


6/2/21 14:50 35.9   113/57 (75)  Room Air  


 


6/2/21 14:15      Room Air  


 


6/2/21 14:00      Room Air  


 


6/2/21 13:50  102 18 117/62 (80)  Room Air  


 


6/2/21 13:30 36.3 100 18 115/67 (83) 96 Room Air  


 


6/2/21 13:15 36.3 100 18  96 Room Air  














I & O 


 


 6/3/21





 06:59


 


Intake Total 2500 ml


 


Balance 2500 ml





Vital signs are stable.  Patient is afebrile.





Fundus is firm below the umbilicus and nontender.


Extremities show no clubbing cyanosis.  There is no Homans' sign.





Assessment and plan      postpartum day #1 status post term spontaneous vaginal 

labor at 38+ weeks gestation.  Patient is doing well and will have routine 

convalescent care today and likely discharge home tomorrow


Final Diagnosis


38-week spontaneous vaginal delivery











DORI SOUSA MD        Mann 3, 2021 09:05

## 2021-06-03 NOTE — OPERATIVE REPORT
DATE OF SERVICE:  06/02/2021



DELIVERY NOTE



The patient delivered by term spontaneous vaginal delivery a viable male infant

with Apgars of 9 and 9 at one and 5 minutes respectively, weight of 7 pounds 6

ounces, birth time of 21:19 and a cord blood pH that is pending.  The infant was

delivered over a perineorrhaphy/episiotomy that was performed as the baby was

attempting to crown, but unable to do so with a large gush of blood from the

right labia minora as the right mid labia minora began to avulse.  Decision was

made to minimize the trauma and extension of the tear in the right labia minora

by performing the episiotomy/perineorrhaphy.  With that done, the delivery was

completed.  There was a nuchal cord x1, it was easily released.  The infant was

bulb suctioned on delivery of the head and again on completion of delivery. 

Umbilical cord when relatively pulseless was doubly clamped, father cut the

cord, the baby was passed to mom's abdomen.



The placenta delivered fairly promptly spontaneously Hernandez.  It was normal

with a 3-vessel cord.



The cervix, vagina, rectum, and perineum were examined and found intact, except

for a right periurethral labial minora laceration/partial avulsion and the

perineorrhaphy/episiotomy that was performed to minimize the additional trauma

to the right labia.  The decision had been made to shorten the second stage of

labor in addition because the fetal heart rate was down into the 90s and being

down for about 3 minutes at the time of the episiotomy/perineorrhaphy was

performed and delivery completed.



The placenta was sent to pathology for permanent section.



The right labial partial avulsion/laceration was repaired with a suture of 3-0

Vicryl Rapide.  The episiotomy/perineorrhaphy was repaired with an additional

suture of 3-0 Vicryl Rapide in the usual manner to good reapproximation and good

hemostasis.



The patient had a large acrochordon on the left inner groin at the junction of

the groin with the labia minora.  This area had been somewhat traumatized and

was anesthetized with the epidural.  Decision was made to go ahead and remove

that growth which was done and then the defect was closed with 2 interrupted

sutures of 3-0 Vicryl Rapide as well.



Sponge and needle counts were correct on completion of delivery and the repair. 

Blood loss was around 150 mL.  The patient tolerated the delivery and the repair

well and remained in the LDR for recovery.  The baby remained with the mom.





Job ID: [id number]

DocumentID: [id number]

Dictated Date:  06/02/2021 21:41:01

Transcription Date: 06/03/2021 02:28:23

Dictated By: DORI SOUSA MD

## 2021-06-04 VITALS — DIASTOLIC BLOOD PRESSURE: 66 MMHG | SYSTOLIC BLOOD PRESSURE: 123 MMHG

## 2021-06-04 VITALS — SYSTOLIC BLOOD PRESSURE: 122 MMHG | DIASTOLIC BLOOD PRESSURE: 75 MMHG

## 2021-06-04 RX ADMIN — DOCUSATE SODIUM SCH MG: 100 CAPSULE ORAL at 08:20

## 2021-06-04 RX ADMIN — IBUPROFEN SCH MG: 800 TABLET, FILM COATED ORAL at 04:25

## 2021-06-04 RX ADMIN — IBUPROFEN SCH MG: 800 TABLET, FILM COATED ORAL at 10:04

## 2023-01-17 ENCOUNTER — HOSPITAL ENCOUNTER (OUTPATIENT)
Dept: HOSPITAL 75 - RAD | Age: 33
End: 2023-01-17
Attending: OBSTETRICS & GYNECOLOGY
Payer: COMMERCIAL

## 2023-01-17 DIAGNOSIS — Z3A.19: ICD-10-CM

## 2023-01-17 DIAGNOSIS — Z34.82: Primary | ICD-10-CM

## 2023-01-17 PROCEDURE — 76805 OB US >/= 14 WKS SNGL FETUS: CPT

## 2023-05-30 ENCOUNTER — HOSPITAL ENCOUNTER (OUTPATIENT)
Dept: HOSPITAL 75 - WSO | Age: 33
Discharge: HOME | End: 2023-05-30
Attending: OBSTETRICS & GYNECOLOGY
Payer: COMMERCIAL

## 2023-05-30 VITALS — SYSTOLIC BLOOD PRESSURE: 20 MMHG | DIASTOLIC BLOOD PRESSURE: 70 MMHG

## 2023-05-30 VITALS — HEIGHT: 66.02 IN | WEIGHT: 223.11 LBS | BODY MASS INDEX: 35.86 KG/M2

## 2023-05-30 DIAGNOSIS — Z3A.37: ICD-10-CM

## 2023-05-30 DIAGNOSIS — Z34.93: Primary | ICD-10-CM

## 2023-05-30 PROCEDURE — 99213 OFFICE O/P EST LOW 20 MIN: CPT

## 2023-06-12 ENCOUNTER — HOSPITAL ENCOUNTER (INPATIENT)
Dept: HOSPITAL 75 - LDRP | Age: 33
LOS: 1 days | Discharge: HOME | End: 2023-06-13
Attending: OBSTETRICS & GYNECOLOGY | Admitting: OBSTETRICS & GYNECOLOGY
Payer: COMMERCIAL

## 2023-06-12 VITALS — SYSTOLIC BLOOD PRESSURE: 130 MMHG | DIASTOLIC BLOOD PRESSURE: 76 MMHG

## 2023-06-12 VITALS — DIASTOLIC BLOOD PRESSURE: 70 MMHG | SYSTOLIC BLOOD PRESSURE: 129 MMHG

## 2023-06-12 VITALS — SYSTOLIC BLOOD PRESSURE: 140 MMHG | DIASTOLIC BLOOD PRESSURE: 74 MMHG

## 2023-06-12 VITALS — SYSTOLIC BLOOD PRESSURE: 113 MMHG | DIASTOLIC BLOOD PRESSURE: 65 MMHG

## 2023-06-12 VITALS — DIASTOLIC BLOOD PRESSURE: 78 MMHG | SYSTOLIC BLOOD PRESSURE: 126 MMHG

## 2023-06-12 VITALS — DIASTOLIC BLOOD PRESSURE: 68 MMHG | SYSTOLIC BLOOD PRESSURE: 133 MMHG

## 2023-06-12 VITALS — DIASTOLIC BLOOD PRESSURE: 71 MMHG | SYSTOLIC BLOOD PRESSURE: 128 MMHG

## 2023-06-12 VITALS — SYSTOLIC BLOOD PRESSURE: 120 MMHG | DIASTOLIC BLOOD PRESSURE: 75 MMHG

## 2023-06-12 VITALS — DIASTOLIC BLOOD PRESSURE: 77 MMHG | SYSTOLIC BLOOD PRESSURE: 132 MMHG

## 2023-06-12 VITALS — DIASTOLIC BLOOD PRESSURE: 82 MMHG | SYSTOLIC BLOOD PRESSURE: 134 MMHG

## 2023-06-12 VITALS — DIASTOLIC BLOOD PRESSURE: 59 MMHG | SYSTOLIC BLOOD PRESSURE: 122 MMHG

## 2023-06-12 VITALS — DIASTOLIC BLOOD PRESSURE: 60 MMHG | SYSTOLIC BLOOD PRESSURE: 117 MMHG

## 2023-06-12 VITALS — DIASTOLIC BLOOD PRESSURE: 87 MMHG | SYSTOLIC BLOOD PRESSURE: 138 MMHG

## 2023-06-12 VITALS — SYSTOLIC BLOOD PRESSURE: 143 MMHG | DIASTOLIC BLOOD PRESSURE: 80 MMHG

## 2023-06-12 VITALS — DIASTOLIC BLOOD PRESSURE: 76 MMHG | SYSTOLIC BLOOD PRESSURE: 137 MMHG

## 2023-06-12 VITALS — SYSTOLIC BLOOD PRESSURE: 126 MMHG | DIASTOLIC BLOOD PRESSURE: 75 MMHG

## 2023-06-12 VITALS — SYSTOLIC BLOOD PRESSURE: 127 MMHG | DIASTOLIC BLOOD PRESSURE: 52 MMHG

## 2023-06-12 VITALS — SYSTOLIC BLOOD PRESSURE: 129 MMHG | DIASTOLIC BLOOD PRESSURE: 86 MMHG

## 2023-06-12 VITALS — SYSTOLIC BLOOD PRESSURE: 130 MMHG | DIASTOLIC BLOOD PRESSURE: 73 MMHG

## 2023-06-12 VITALS — SYSTOLIC BLOOD PRESSURE: 130 MMHG | DIASTOLIC BLOOD PRESSURE: 78 MMHG

## 2023-06-12 VITALS — SYSTOLIC BLOOD PRESSURE: 120 MMHG | DIASTOLIC BLOOD PRESSURE: 60 MMHG

## 2023-06-12 VITALS — SYSTOLIC BLOOD PRESSURE: 129 MMHG | DIASTOLIC BLOOD PRESSURE: 69 MMHG

## 2023-06-12 VITALS — SYSTOLIC BLOOD PRESSURE: 130 MMHG | DIASTOLIC BLOOD PRESSURE: 80 MMHG

## 2023-06-12 VITALS — SYSTOLIC BLOOD PRESSURE: 124 MMHG | DIASTOLIC BLOOD PRESSURE: 89 MMHG

## 2023-06-12 VITALS — SYSTOLIC BLOOD PRESSURE: 123 MMHG | DIASTOLIC BLOOD PRESSURE: 62 MMHG

## 2023-06-12 VITALS — DIASTOLIC BLOOD PRESSURE: 71 MMHG | SYSTOLIC BLOOD PRESSURE: 116 MMHG

## 2023-06-12 VITALS — SYSTOLIC BLOOD PRESSURE: 123 MMHG | DIASTOLIC BLOOD PRESSURE: 65 MMHG

## 2023-06-12 VITALS — WEIGHT: 224.43 LBS | BODY MASS INDEX: 36.07 KG/M2 | HEIGHT: 66.02 IN

## 2023-06-12 VITALS — DIASTOLIC BLOOD PRESSURE: 81 MMHG | SYSTOLIC BLOOD PRESSURE: 143 MMHG

## 2023-06-12 VITALS — SYSTOLIC BLOOD PRESSURE: 117 MMHG | DIASTOLIC BLOOD PRESSURE: 64 MMHG

## 2023-06-12 VITALS — SYSTOLIC BLOOD PRESSURE: 130 MMHG | DIASTOLIC BLOOD PRESSURE: 67 MMHG

## 2023-06-12 VITALS — DIASTOLIC BLOOD PRESSURE: 63 MMHG | SYSTOLIC BLOOD PRESSURE: 134 MMHG

## 2023-06-12 VITALS — DIASTOLIC BLOOD PRESSURE: 77 MMHG | SYSTOLIC BLOOD PRESSURE: 131 MMHG

## 2023-06-12 VITALS — DIASTOLIC BLOOD PRESSURE: 89 MMHG | SYSTOLIC BLOOD PRESSURE: 128 MMHG

## 2023-06-12 VITALS — SYSTOLIC BLOOD PRESSURE: 108 MMHG | DIASTOLIC BLOOD PRESSURE: 80 MMHG

## 2023-06-12 VITALS — SYSTOLIC BLOOD PRESSURE: 131 MMHG | DIASTOLIC BLOOD PRESSURE: 79 MMHG

## 2023-06-12 VITALS — SYSTOLIC BLOOD PRESSURE: 132 MMHG | DIASTOLIC BLOOD PRESSURE: 74 MMHG

## 2023-06-12 VITALS — SYSTOLIC BLOOD PRESSURE: 137 MMHG | DIASTOLIC BLOOD PRESSURE: 78 MMHG

## 2023-06-12 VITALS — DIASTOLIC BLOOD PRESSURE: 58 MMHG | SYSTOLIC BLOOD PRESSURE: 118 MMHG

## 2023-06-12 VITALS — DIASTOLIC BLOOD PRESSURE: 68 MMHG | SYSTOLIC BLOOD PRESSURE: 109 MMHG

## 2023-06-12 VITALS — DIASTOLIC BLOOD PRESSURE: 75 MMHG | SYSTOLIC BLOOD PRESSURE: 135 MMHG

## 2023-06-12 VITALS — SYSTOLIC BLOOD PRESSURE: 139 MMHG | DIASTOLIC BLOOD PRESSURE: 70 MMHG

## 2023-06-12 VITALS — DIASTOLIC BLOOD PRESSURE: 74 MMHG | SYSTOLIC BLOOD PRESSURE: 133 MMHG

## 2023-06-12 VITALS — SYSTOLIC BLOOD PRESSURE: 132 MMHG | DIASTOLIC BLOOD PRESSURE: 88 MMHG

## 2023-06-12 VITALS — DIASTOLIC BLOOD PRESSURE: 65 MMHG | SYSTOLIC BLOOD PRESSURE: 113 MMHG

## 2023-06-12 VITALS — DIASTOLIC BLOOD PRESSURE: 68 MMHG | SYSTOLIC BLOOD PRESSURE: 128 MMHG

## 2023-06-12 VITALS — DIASTOLIC BLOOD PRESSURE: 73 MMHG | SYSTOLIC BLOOD PRESSURE: 129 MMHG

## 2023-06-12 VITALS — DIASTOLIC BLOOD PRESSURE: 55 MMHG | SYSTOLIC BLOOD PRESSURE: 114 MMHG

## 2023-06-12 VITALS — DIASTOLIC BLOOD PRESSURE: 72 MMHG | SYSTOLIC BLOOD PRESSURE: 123 MMHG

## 2023-06-12 VITALS — DIASTOLIC BLOOD PRESSURE: 96 MMHG | SYSTOLIC BLOOD PRESSURE: 135 MMHG

## 2023-06-12 VITALS — DIASTOLIC BLOOD PRESSURE: 80 MMHG | SYSTOLIC BLOOD PRESSURE: 129 MMHG

## 2023-06-12 VITALS — SYSTOLIC BLOOD PRESSURE: 127 MMHG | DIASTOLIC BLOOD PRESSURE: 60 MMHG

## 2023-06-12 VITALS — SYSTOLIC BLOOD PRESSURE: 127 MMHG | DIASTOLIC BLOOD PRESSURE: 68 MMHG

## 2023-06-12 VITALS — SYSTOLIC BLOOD PRESSURE: 131 MMHG | DIASTOLIC BLOOD PRESSURE: 77 MMHG

## 2023-06-12 VITALS — DIASTOLIC BLOOD PRESSURE: 67 MMHG | SYSTOLIC BLOOD PRESSURE: 123 MMHG

## 2023-06-12 VITALS — DIASTOLIC BLOOD PRESSURE: 73 MMHG | SYSTOLIC BLOOD PRESSURE: 132 MMHG

## 2023-06-12 VITALS — DIASTOLIC BLOOD PRESSURE: 84 MMHG | SYSTOLIC BLOOD PRESSURE: 133 MMHG

## 2023-06-12 DIAGNOSIS — Z3A.39: ICD-10-CM

## 2023-06-12 LAB
APTT PPP: YELLOW S
BACTERIA #/AREA URNS HPF: (no result) /HPF
BASOPHILS # BLD AUTO: 0 10^3/UL (ref 0–0.1)
BASOPHILS NFR BLD AUTO: 0 % (ref 0–10)
BILIRUB UR QL STRIP: NEGATIVE
EOSINOPHIL # BLD AUTO: 0.2 10^3/UL (ref 0–0.3)
EOSINOPHIL NFR BLD AUTO: 2 % (ref 0–10)
FIBRINOGEN PPP-MCNC: (no result) MG/DL
GLUCOSE UR STRIP-MCNC: NEGATIVE MG/DL
HCT VFR BLD CALC: 32 % (ref 35–52)
HGB BLD-MCNC: 10.7 G/DL (ref 11.5–16)
KETONES UR QL STRIP: NEGATIVE
LEUKOCYTE ESTERASE UR QL STRIP: (no result)
LYMPHOCYTES # BLD AUTO: 1.6 10^3/UL (ref 1–4)
LYMPHOCYTES NFR BLD AUTO: 18 % (ref 12–44)
MANUAL DIFFERENTIAL PERFORMED BLD QL: NO
MCH RBC QN AUTO: 27 PG (ref 25–34)
MCHC RBC AUTO-ENTMCNC: 33 G/DL (ref 32–36)
MCV RBC AUTO: 82 FL (ref 80–99)
MONOCYTES # BLD AUTO: 0.7 10^3/UL (ref 0–1)
MONOCYTES NFR BLD AUTO: 8 % (ref 0–12)
NEUTROPHILS # BLD AUTO: 6.4 10^3/UL (ref 1.8–7.8)
NEUTROPHILS NFR BLD AUTO: 72 % (ref 42–75)
NITRITE UR QL STRIP: NEGATIVE
PH UR STRIP: 6 [PH] (ref 5–9)
PLATELET # BLD: 185 10^3/UL (ref 130–400)
PMV BLD AUTO: 12.1 FL (ref 9–12.2)
PROT UR QL STRIP: NEGATIVE
RBC #/AREA URNS HPF: (no result) /HPF
SP GR UR STRIP: >=1.03 (ref 1.02–1.02)
WBC # BLD AUTO: 8.9 10^3/UL (ref 4.3–11)
WBC #/AREA URNS HPF: (no result) /HPF

## 2023-06-12 PROCEDURE — 36415 COLL VENOUS BLD VENIPUNCTURE: CPT

## 2023-06-12 PROCEDURE — 85025 COMPLETE CBC W/AUTO DIFF WBC: CPT

## 2023-06-12 PROCEDURE — 10907ZC DRAINAGE OF AMNIOTIC FLUID, THERAPEUTIC FROM PRODUCTS OF CONCEPTION, VIA NATURAL OR ARTIFICIAL OPENING: ICD-10-PCS | Performed by: OBSTETRICS & GYNECOLOGY

## 2023-06-12 PROCEDURE — 86850 RBC ANTIBODY SCREEN: CPT

## 2023-06-12 PROCEDURE — 86901 BLOOD TYPING SEROLOGIC RH(D): CPT

## 2023-06-12 PROCEDURE — 81000 URINALYSIS NONAUTO W/SCOPE: CPT

## 2023-06-12 PROCEDURE — 0KQM0ZZ REPAIR PERINEUM MUSCLE, OPEN APPROACH: ICD-10-PCS | Performed by: OBSTETRICS & GYNECOLOGY

## 2023-06-12 PROCEDURE — 86780 TREPONEMA PALLIDUM: CPT

## 2023-06-12 PROCEDURE — 82947 ASSAY GLUCOSE BLOOD QUANT: CPT

## 2023-06-12 PROCEDURE — 87088 URINE BACTERIA CULTURE: CPT

## 2023-06-12 PROCEDURE — 86900 BLOOD TYPING SEROLOGIC ABO: CPT

## 2023-06-12 RX ADMIN — IBUPROFEN SCH MG: 600 TABLET ORAL at 21:02

## 2023-06-12 RX ADMIN — HYDROCODONE BITARTRATE AND ACETAMINOPHEN PRN EA: 5; 325 TABLET ORAL at 21:59

## 2023-06-12 RX ADMIN — Medication SCH ML: at 14:00

## 2023-06-12 RX ADMIN — Medication SCH ML: at 22:00

## 2023-06-12 RX ADMIN — HYDROCODONE BITARTRATE AND ACETAMINOPHEN PRN EA: 5; 325 TABLET ORAL at 18:11

## 2023-06-12 RX ADMIN — IBUPROFEN SCH MG: 600 TABLET ORAL at 13:53

## 2023-06-12 RX ADMIN — DOCUSATE SODIUM SCH MG: 100 CAPSULE ORAL at 21:02

## 2023-06-12 NOTE — HISTORY & PHYSICAL-OB
OB - Chief Complaint & HPI


Date/Time


Date of Admission:


Date of Admission:  2023 at 06:40


Date seen by a Provider:  2023


Time Seen by a Provider:  07:54





Chief Complaint/History


OB-Reason for Admission/Chief:  Induction of Labor


Hx :  3


Hx Para:  2


Expected Date of Delivery:  2023


Gestational Age in Weeks:  39


Gestational Age in Days:  0


Admission Nurse Assessment Rev:  Yes





Allergies and Home Medications


Allergies


Coded Allergies:  


     No Known Drug Allergies (Unverified , 11)





Patient Home Medication List


Home Medication List Reviewed:  Yes


Levothyroxine Sodium (Synthroid) 200 Mcg Tablet, 200 MCG PO DAILY, (Reported)


   Entered as Reported by: LUL SAAVEDRA on 10/2/18 134


Prenatal Vit/Iron Fumarate/FA (Prenatal Tablet) 1 Each Tablet, 1 EACH PO DAILY, 

(Reported)


   Entered as Reported by: LUL SAAVEDRA on 10/2/18 1346





OB - History


Hx of Present Pregnancy


Prenatal Care:  Yes


Ultrasounds:  Normal mid trimester US


Obstetrical Complications:  None





Delivery History


Hx Blood Disorders:  No


Adverse Rxn to Tranfusion:  No





Patient Past Medical History





Hx of Gestational Thrombocytopenia, and oligohydramnios





Social History/Family History


2nd Hand Smoke Exposure:  No





OB - Admission Exam


Physical Exam


HEENT:  NCAT


Heart:  Rhythm Normal


Lungs:  Clear


Abdomen:  Gravid


Extremities:  Normal


Reflexes:  Normal


Cervical Dilatation:  3cm


Effacement:  75%


Station:  -2


Membranes:  Intact


Fetal Heart Rate:  130's


Accelerations:  Accelerations Present


Decelerations:  No Decelerations


Short Term Variability:  Present


Long Term Variability:  Average (6-25)


Contractions on Admission:  6-10 Minutes Apart


Intensity:  Mild





Cooley Scoring Tool (Modified)


Dilation (cm):  3-4cm (2)


Effacement (%):  51-79%  (2)


Fetal Descent/Station:  -2        (1)


Cervix Consistency:  Soft      (2)


Cervix Position:  Posterior  (0)


Add 1 point for:  Each previous vaginal delivery (1)


Cooley Score:  9


Labs





Laboratory Tests








Test


 23


07:35 Range/Units


 


 


White Blood Count


 8.9 


 4.3-11.0


10^3/uL


 


Red Blood Count


 3.91 


 3.80-5.11


10^6/uL


 


Hemoglobin 10.7 L 11.5-16.0  g/dL


 


Hematocrit 32 L 35-52  %


 


Mean Corpuscular Volume 82  80-99  fL


 


Mean Corpuscular Hemoglobin 27  25-34  pg


 


Mean Corpuscular Hemoglobin


Concent 33 


 32-36  g/dL





 


Red Cell Distribution Width 14.3  10.0-14.5  %


 


Platelet Count


 185 


 130-400


10^3/uL


 


Mean Platelet Volume 12.1  9.0-12.2  fL


 


Immature Granulocyte % (Auto) 1   %


 


Neutrophils (%) (Auto) 72  42-75  %


 


Lymphocytes (%) (Auto) 18  12-44  %


 


Monocytes (%) (Auto) 8  0-12  %


 


Eosinophils (%) (Auto) 2  0-10  %


 


Basophils (%) (Auto) 0  0-10  %


 


Neutrophils # (Auto)


 6.4 


 1.8-7.8


10^3/uL


 


Lymphocytes # (Auto)


 1.6 


 1.0-4.0


10^3/uL


 


Monocytes # (Auto)


 0.7 


 0.0-1.0


10^3/uL


 


Eosinophils # (Auto)


 0.2 


 0.0-0.3


10^3/uL


 


Basophils # (Auto)


 0.0 


 0.0-0.1


10^3/uL


 


Immature Granulocyte # (Auto)


 0.1 


 0.0-0.1


10^3/uL











OB - Assessment/Plan/Diagnosis


Assessment


Assessment:  induction of labor


Admission Dx


31 yo  @ 39 weeks


IOL


GBS neg


Admission Status:  Inpatient Order (span 2 midnights)


Reason for Inpatient Admission:  


IOL at 39 weeks





Plan


Plan:  Induction


Induction Method:  LEWIS CAUSEY DO            2023 08:01

## 2023-06-12 NOTE — OB LABOR & DELIVERY RECORD
L&D History


Date of Service


Date of Service:  2023





History


Expected Date of Delivery:  2023


Gestational Age in Weeks:  39


Hx :  3


Hx Para:  2





Pregnancy Complications


Prenatal Events:  Routine Prenatal care


Operative Indications (Cesarea:  N/A-Vaginal Delivery


Intrapartal Events:  None





L&D Stage1


Stage One


Onset of Labor - Date:  2023





Monitors and Tracing


Fetal Monitor Accelerations:  Uniform


Fetal Monitor Decelerations:  None


Long Term Variability:  Average (6-10)


Short Term Variability:  Present


Fetal Presentation:  Vertex





Rupture of Membranes


Spontaneous Ruture of Membrane:  No


Amniotic Membrane Rupture Time:  07:45


Amniotic Membrane Fluid Desc.:  Clear


Vaginal Bleeding Description:  Normal Show





Induction/Anesthesia


Epidural Cath Placement - Time:  1004





Progress/Notes


Patient admitted for IOL.  AROM performed using scalp electrode.  She was 

augmented with pitocin to max dose of 10u, and progressed after her epidural to 

complete and + 2 station.





L&D Stage2


Stage Two


Stage II Date:  2023





Monitors and Tracing


Fetal Monitor Mode:  Internal


Fetal Heart Rate:  115


Fetal Monitor Accelerations:  Uniform


Fetal Monitor Decelerations:  Variable


Long Term Variability:  Average (6-10)


Short Term Variability:  Present


Fetal Position:  Right Occiput Anterior


Fetal Presentation:  Vertex





Cord Descript/Complications


Fetal Cord Vessel Description:  3 Vessels





Delivery Type


Infant Delivery Method:  Spontaneous Vaginal


Anterior Shoulder:  Right





Episiotomy/Perineal Laceration


Laceraction(s)/Extensions:  Yes


Episiotomy Description:  Perineal Extension/lac, 1st degree, 2nd degree 

(repaired using 3-0 rapide vicryl in usual fashion)





Condition of Infant


Delivery


1 minute Apgar Comment:  


9


5 minute Apgar Comment:  


9


Notes


live male infant weight 8lbs 1 oz





Condition of Infant


Condition of Infant:  Living


Exam:  No Observed Abnormalities





Resuscitation


Resuscitation:  N/A - Spontaneous Resp





L&D Stage3


Stage Three


Stage III Date:  2023





Pictocin


Pitocin Administration Comment:  


30 mu wide open after delivery of placenta





Placenta Delivery


Placenta Delivery:  Spontaneous





Delivery Summary


Summary


Estimated blood loss (mL):  300


Attending at delivery:


Lewis Smith DO





Condition of Delivery


Examined:  Cervix Examined, Uterus Explored


Post Partum Hemorrhage:  No


Condition of Mother


stable


Condition of Infant (s)


stable











LEWIS SMITH DO            2023 13:34

## 2023-06-13 VITALS — DIASTOLIC BLOOD PRESSURE: 62 MMHG | SYSTOLIC BLOOD PRESSURE: 108 MMHG

## 2023-06-13 VITALS — DIASTOLIC BLOOD PRESSURE: 57 MMHG | SYSTOLIC BLOOD PRESSURE: 120 MMHG

## 2023-06-13 VITALS — SYSTOLIC BLOOD PRESSURE: 120 MMHG | DIASTOLIC BLOOD PRESSURE: 57 MMHG

## 2023-06-13 VITALS — SYSTOLIC BLOOD PRESSURE: 126 MMHG | DIASTOLIC BLOOD PRESSURE: 76 MMHG

## 2023-06-13 VITALS — DIASTOLIC BLOOD PRESSURE: 72 MMHG | SYSTOLIC BLOOD PRESSURE: 116 MMHG

## 2023-06-13 LAB
BASOPHILS # BLD AUTO: 0 10^3/UL (ref 0–0.1)
BASOPHILS NFR BLD AUTO: 0 % (ref 0–10)
EOSINOPHIL # BLD AUTO: 0.2 10^3/UL (ref 0–0.3)
EOSINOPHIL NFR BLD AUTO: 2 % (ref 0–10)
HCT VFR BLD CALC: 31 % (ref 35–52)
HGB BLD-MCNC: 9.8 G/DL (ref 11.5–16)
LYMPHOCYTES # BLD AUTO: 1.9 10^3/UL (ref 1–4)
LYMPHOCYTES NFR BLD AUTO: 18 % (ref 12–44)
MANUAL DIFFERENTIAL PERFORMED BLD QL: NO
MCH RBC QN AUTO: 27 PG (ref 25–34)
MCHC RBC AUTO-ENTMCNC: 32 G/DL (ref 32–36)
MCV RBC AUTO: 85 FL (ref 80–99)
MONOCYTES # BLD AUTO: 0.6 10^3/UL (ref 0–1)
MONOCYTES NFR BLD AUTO: 6 % (ref 0–12)
NEUTROPHILS # BLD AUTO: 7.5 10^3/UL (ref 1.8–7.8)
NEUTROPHILS NFR BLD AUTO: 73 % (ref 42–75)
PLATELET # BLD: 174 10^3/UL (ref 130–400)
PMV BLD AUTO: 12.8 FL (ref 9–12.2)
WBC # BLD AUTO: 10.3 10^3/UL (ref 4.3–11)

## 2023-06-13 RX ADMIN — HYDROCODONE BITARTRATE AND ACETAMINOPHEN PRN EA: 5; 325 TABLET ORAL at 05:30

## 2023-06-13 RX ADMIN — IBUPROFEN SCH MG: 600 TABLET ORAL at 03:55

## 2023-06-13 RX ADMIN — IBUPROFEN SCH MG: 600 TABLET ORAL at 10:16

## 2023-06-13 RX ADMIN — DOCUSATE SODIUM SCH MG: 100 CAPSULE ORAL at 08:10

## 2023-06-13 RX ADMIN — HYDROCODONE BITARTRATE AND ACETAMINOPHEN PRN EA: 5; 325 TABLET ORAL at 10:21

## 2023-06-13 NOTE — DISCHARGE SUMMARY
Discharge Summary


Hospital Course


Problems Reviewed?:  Yes


Hospital Course


Date of Admission: 2023 at 06:40 


Admission Diagnosis :  





[facility] [place]  





Date of Discharge: 23 


Discharge Diagnosis:Status post vaginal delivery








Hospital Course:


Patient was admitted for labor delivered a liveborn  did very well 

postpartum.  Was discharged home on postpartum day #1














Labs and Pending Lab Test:


Laboratory Tests


23 08:14: Glucometer 89


23 05:30: 


White Blood Count 10.3, Red Blood Count 3.60L, Hemoglobin 9.8L, Hematocrit 31L, 

Mean Corpuscular Volume 85, Mean Corpuscular Hemoglobin 27, Mean Corpuscular 

Hemoglobin Concent 32, Red Cell Distribution Width 14.4, Platelet Count 174, 

Mean Platelet Volume 12.8H, Immature Granulocyte % (Auto) 1, Neutrophils (%) 

(Auto) 73, Lymphocytes (%) (Auto) 18, Monocytes (%) (Auto) 6, Eosinophils (%) 

(Auto) 2, Basophils (%) (Auto) 0, Neutrophils # (Auto) 7.5, Lymphocytes # (Auto)

1.9, Monocytes # (Auto) 0.6, Eosinophils # (Auto) 0.2, Basophils # (Auto) 0.0, 

Immature Granulocyte # (Auto) 0.1





Home Meds


Active


A.e.r Pads (Witch Hazel/Glycerin) 12.5 %-50 % Pad 0 Ea TOP UD PRN


     Apply to affected area as needed


Dibucaine 1 % Oint 0 Gm TOP UD PRN


     Apply to affected area as needed


Dermoplast Pain Relieving Spry (Benzocaine/Menthol) 20 %-0.5 % Aerosol 0 Ea TP 

UD PRN 30 Days


     Apply to affected area as needed


Docusate Sodium 100 Mg Capsule 100 Mg PO BID


     Take 1 tablet twice a day


Ibu (Ibuprofen) 600 Mg Tablet 600 Mg PO Q6H


     Take 1 tablet every 6 hours as needed for pain


Ferosul (Ferrous Sulfate) 325 Mg (65 Mg Iron) Tablet 325 Mg PO DAILY


     Take 1 tablet daily


Reported


Prenatal Tablet (Prenatal Vit/Iron Fumarate/FA) 1 Each Tablet 1 Each PO DAILY


Synthroid (Levothyroxine Sodium) 200 Mcg Tablet 200 Mcg PO DAILY


Activity:  Activity as Tolerated


Driving Instructions:  You May Drive


NO SMOKING:  NO SMOKING


Nothing Inside Vagina:  No Douching, No Stokes, No Tampons


Discharge Diet:  No Restrictions, Regular Diet


Symptoms to Report to :  Appetite Changes, Pain Increased, Fever Over 101 

Degrees F, Pain/Pressure in Chest, Vaginal Bleeding Increase, Vaginal Discharge 

Foul, Nausea/Vomiting


For Any Problems or Questions:  Contact Your Physician


Infection Signs and Symptoms:  Increased Redness, Foul Odor of Wound, Increased 

Drainage, Skin Itchy or Has a Rash, Increased Swelling, Temperature Above 101  F


Bathing Instructions:  Shower





Discharge Physical Examination


Allergies:  


Coded Allergies:  


     No Known Drug Allergies (Unverified , 11)


Vitals & I&Os





Vital Signs








  Date Time  Temp Pulse Resp B/P (MAP) Pulse Ox O2 Delivery O2 Flow Rate FiO2


 


23 03:55 36.0 83 18 108/62 (77) 98 Room Air  








General Appearance:  No Apparent Distress, WD/WN


HEENT:  PERRL/EOMI


Respiratory:  Chest Non Tender, Lungs Clear, Normal Breath Sounds, No Accessory 

Muscle Use, No Respiratory Distress


Cardiovascular:  Regular Rate, Rhythm, No Edema


Gastrointestinal:  Normal Bowel Sounds, Non Tender, Soft


Extremity:  Normal Inspection, Non Tender, No Pedal Edema


Skin:  Normal Color, Warm/Dry


Neurologic/Psychiatric:  Alert, Oriented x3, Normal Mood/Affect, CNs II-XII Norm

as Tested





Discharge Summary


Date of Admission


2023 at 06:40


Date of Discharge





Discharge Date:  2023





Supervisory-Addendum Brief


Verification & Attestation


Participated in pt care:  MDM, physical


Personally performed:  exam


Care discussed with:  other (NA)


Procedures:  n/a


I personally examined this patient on her postpartum day #1











CANDELARIO ONEILL DO          2023 08:06

## 2023-06-13 NOTE — ANESTHESIA-REGIONAL POST-OP
Regional


Patient Condition


Mental Status:  Alert, Oriented x3


Circulation:  Same as Pre-Op


Headache:  Absent


Sensation:  Full Recovery


Motor Block:  Absent





Post Op Complications


Complications


None





Follow Up Care/Instructions


Patient Instructions


None needed.





Anesthesia/Patient Condition


Patient is doing well, no complaints, stable vital signs, no apparent adverse 

anesthesia problems.   


No complications reported per nursing.











JAVIER RUELAS DO         Jun 13, 2023 09:47

## 2023-06-13 NOTE — POSTPARTUM PROGRESS NOTE
Postpartum Note


Postpartum Note


Postpartum Day #1





Subjective:


Patient is without complaints. Ambulating, voiding. Tolerating a regular diet 

without nausea or vomiting. Normal lochia. Pain is well controlled with oral 

pain medications. Breast-feeding going well.  Patient would like to go home 

today.





Objective:


[]





Physical Exam:


General - Alert and oriented, no apparent distress


Breast symmetrical no erythema, edema or engorgement


Abdomen - Soft, appropriately tender to palpation, non-distended, fundus firm at

 umbilicus


Lochia minimal


Extremities - no edema, negative Del's bilaterally 





Assessment:


[] post-partum day #1, status post [] vaginal delivery.


Recovering well, hemodynamically stable





Plan:


Routine postpartum care.


Encourage breast feeding.


Encourage ambulation.


Ferrous sulfate supplementation.


Plan for discharge today





Vitals - Labs


Vital Signs - I&O





Vital Signs








  Date Time  Temp Pulse Resp B/P (MAP) Pulse Ox O2 Delivery O2 Flow Rate FiO2


 


6/13/23 03:55 36.0 83 18 108/62 (77) 98 Room Air  


 


6/13/23 01:00 36.2 84 18 116/72 (87) 98 Room Air  


 


6/12/23 21:00 36.0 79 18 109/68 (82) 98 Room Air  


 


6/12/23 17:45 37.0 88 18 128/68 (88) 98 Room Air  


 


6/12/23 15:25  94 18 118/58 (78)  Room Air  


 


6/12/23 14:56 36.5 86 18 128/71 (90)  Room Air  


 


6/12/23 14:25  78 18 123/67 (85)  Room Air  


 


6/12/23 14:10  80 18 129/69 (89)  Room Air  


 


6/12/23 13:55  76 18 113/65 (81)  Room Air  


 


6/12/23 13:40 36.5 85 18 113/65 (81)  Room Air  


 


6/12/23 13:27  93 18 127/60 (82)  Room Air  


 


6/12/23 13:22  84 18 137/76 (96)  Room Air  


 


6/12/23 13:13  101 18 129/86 (100)  Room Air  


 


6/12/23 13:10  114 18 108/80 (89)  Room Air  


 


6/12/23 13:04  87 18 143/81 (101)  Room Air  


 


6/12/23 12:59  86 18 138/87 (104) 100 Room Air  


 


6/12/23 12:52  75 18 140/74 (96) 100 Room Air  


 


6/12/23 12:48  73 18 135/96 (109) 100 Room Air  


 


6/12/23 12:42  75 18 135/75 (95) 100 Room Air  


 


6/12/23 12:30  73 18 132/73 (92) 98 Room Air  


 


6/12/23 12:13  81 18 129/73 (91) 98 Room Air  


 


6/12/23 11:58 37.0 76 18 134/63 (86) 99 Room Air  


 


6/12/23 11:43  67 18 129/70 (89) 99 Room Air  


 


6/12/23 11:28  75 18 132/77 (95) 100 Room Air  


 


6/12/23 11:26  77 18 130/67 (88) 100 Room Air  


 


6/12/23 11:23  76 18 139/70 (93) 100 Room Air  


 


6/12/23 11:20  76 18 133/84 (100) 100 Room Air  


 


6/12/23 11:17  82 18 128/89 (102) 100 Room Air  


 


6/12/23 11:15  87 18 134/82 (99) 100 Room Air  


 


6/12/23 11:11  80 18 133/68 (89) 99 Room Air  


 


6/12/23 11:08  85 18 137/78 (97) 99 Room Air  


 


6/12/23 11:05  85 18 130/76 (94) 98 Room Air  


 


6/12/23 11:02  92 18 123/72 (89) 98 Room Air  


 


6/12/23 11:00  80 18 126/78 (94) 98 Room Air  


 


6/12/23 10:56  83 18 130/80 (97) 98 Room Air  


 


6/12/23 10:54  82 18 130/78 (95) 98 Room Air  


 


6/12/23 10:51  83 18 126/75 (92) 99 Room Air  


 


6/12/23 10:48  84 18 133/74 (93) 99 Room Air  


 


6/12/23 10:45  85 18 132/74 (93) 99 Room Air  


 


6/12/23 10:42  88 18 127/68 (87) 99 Room Air  


 


6/12/23 10:39  81 18 117/64 (81) 99 Room Air  


 


6/12/23 10:36  80 18 117/60 (79) 99 Room Air  


 


6/12/23 10:33  83 18 123/62 (82) 99 Room Air  


 


6/12/23 10:30 36.8 83 18 120/60 (80) 98 Room Air  


 


6/12/23 10:27  80 18 122/59 (80) 99 Room Air  


 


6/12/23 10:24  91 18 114/55 (74) 99 Room Air  


 


6/12/23 10:20  88 18 127/52 (77) 99 Room Air  


 


6/12/23 10:15  85 18 116/71 (86) 98 Room Air  


 


6/12/23 10:12  79 18 120/75 (90) 100 Room Air  


 


6/12/23 10:09  93 18 132/88 (103) 100 Room Air  


 


6/12/23 10:06  87 18 129/80 (96) 100 Room Air  


 


6/12/23 10:00  88 18 143/80 (101) 100 Room Air  


 


6/12/23 09:43  82 18 130/73 (92) 99 Room Air  


 


6/12/23 09:28  77 18 143/80 (101)  Room Air  


 


6/12/23 09:13  76 18 131/79 (96)  Room Air  


 


6/12/23 09:00 36.5 77 18 131/77 (95)  Room Air  


 


6/12/23 08:44  83 18 131/77 (95)  Room Air  


 


6/12/23 08:28  85 18 124/89 (101)  Room Air  














I & O 


 


 6/13/23





 07:00


 


Intake Total 3000 ml


 


Output Total 1500 ml


 


Balance 1500 ml











Labs


Laboratory Tests


6/12/23 08:14: Glucometer 89


6/13/23 05:30: 


White Blood Count 10.3, Red Blood Count 3.60L, Hemoglobin 9.8L, Hematocrit 31L, 

Mean Corpuscular Volume 85, Mean Corpuscular Hemoglobin 27, Mean Corpuscular 

Hemoglobin Concent 32, Red Cell Distribution Width 14.4, Platelet Count 174, 

Mean Platelet Volume 12.8H, Immature Granulocyte % (Auto) 1, Neutrophils (%) 

(Auto) 73, Lymphocytes (%) (Auto) 18, Monocytes (%) (Auto) 6, Eosinophils (%) 

(Auto) 2, Basophils (%) (Auto) 0, Neutrophils # (Auto) 7.5, Lymphocytes # (Auto)

 1.9, Monocytes # (Auto) 0.6, Eosinophils # (Auto) 0.2, Basophils # (Auto) 0.0, 

Immature Granulocyte # (Auto) 0.1











CANDELARIO ONEILL DO          Jun 13, 2023 08:07 Wife called and updated. She will be coming tomorrow when the weather is more favorable.